# Patient Record
Sex: FEMALE | Race: WHITE | NOT HISPANIC OR LATINO | Employment: FULL TIME | URBAN - METROPOLITAN AREA
[De-identification: names, ages, dates, MRNs, and addresses within clinical notes are randomized per-mention and may not be internally consistent; named-entity substitution may affect disease eponyms.]

---

## 2021-02-17 ENCOUNTER — OFFICE VISIT (OUTPATIENT)
Dept: OBSTETRICS AND GYNECOLOGY | Facility: CLINIC | Age: 26
End: 2021-02-17

## 2021-02-17 VITALS
WEIGHT: 186.9 LBS | DIASTOLIC BLOOD PRESSURE: 78 MMHG | BODY MASS INDEX: 28.33 KG/M2 | HEIGHT: 68 IN | SYSTOLIC BLOOD PRESSURE: 128 MMHG

## 2021-02-17 DIAGNOSIS — N92.6 IRREGULAR MENSES: ICD-10-CM

## 2021-02-17 DIAGNOSIS — N96 HISTORY OF MULTIPLE MISCARRIAGES: ICD-10-CM

## 2021-02-17 DIAGNOSIS — R63.5 WEIGHT GAIN, ABNORMAL: ICD-10-CM

## 2021-02-17 DIAGNOSIS — Z01.419 ENCOUNTER FOR GYNECOLOGICAL EXAMINATION: Primary | ICD-10-CM

## 2021-02-17 PROCEDURE — 99385 PREV VISIT NEW AGE 18-39: CPT | Performed by: OBSTETRICS & GYNECOLOGY

## 2021-02-17 RX ORDER — VENLAFAXINE 75 MG/1
75 TABLET ORAL DAILY
COMMUNITY
End: 2022-01-20

## 2021-02-17 RX ORDER — ARIPIPRAZOLE 5 MG/1
TABLET ORAL
COMMUNITY
Start: 2021-01-18 | End: 2021-05-07

## 2021-02-17 RX ORDER — PRENATAL VIT,CAL 76/IRON/FOLIC 29 MG-1 MG
1 TABLET ORAL DAILY
Qty: 30 TABLET | Refills: 11 | Status: SHIPPED | OUTPATIENT
Start: 2021-02-17 | End: 2021-03-19

## 2021-02-17 RX ORDER — ALPRAZOLAM 0.5 MG/1
0.5 TABLET ORAL DAILY PRN
COMMUNITY
Start: 2021-01-18 | End: 2022-12-05

## 2021-02-17 RX ORDER — ZOLPIDEM TARTRATE 10 MG/1
10 TABLET ORAL NIGHTLY PRN
COMMUNITY
End: 2022-01-20

## 2021-02-17 NOTE — PROGRESS NOTES
GYN Annual Exam     CC - Here for annual exam.        HPI  Kat Funez is a 25 y.o. female, , who presents for annual well woman exam. Patient's last menstrual period was 2021..  Periods are irregular occurring every 4-6 weeks, lasting 3 days. .  Dysmenorrhea:severe, occurring second day.  Patient reports problems with: spotting for a day or 2 and only having 1 day of normal flow. Pt also reports concerns of weight gain and unable to conceive. States she has been trying for a year now. Partner Status: Marital Status: .  Desires STD Screening: no.    Additional OB/GYN History   Current contraception: contraceptive methods: None  Desires to: do not start contraception  Last Pap : 2019 normal per pt  Last Completed Pap Smear     Patient has no health maintenance due at this time        History of abnormal Pap smear: no  Family history of uterine, colon, breast, or ovarian cancer: no  Performs monthly Self-Breast Exam: yes  Exercises Regularly:yes  Feelings of Anxiety or Depression: yes - controlled with medication  Tobacco Usage?: No   OB History        2    Para        Term                AB   2    Living           SAB   2    TAB        Ectopic        Molar        Multiple        Live Births                    Health Maintenance   Topic Date Due   • ANNUAL PHYSICAL  1998   • HPV VACCINES (1 - 2-dose series) 2006   • TDAP/TD VACCINES (1 - Tdap) 2014   • INFLUENZA VACCINE  2020   • HEPATITIS C SCREENING  2021   • LIPID PANEL  2021   • Annual Gynecologic Pelvic and Breast Exam  2022   • Pneumococcal Vaccine 0-64  Aged Out   • MENINGOCOCCAL VACCINE  Aged Out       The additional following portions of the patient's history were reviewed and updated as appropriate: allergies, current medications, past family history, past medical history, past social history, past surgical history and problem list.    Review of Systems   Constitutional: Positive  "for unexpected weight gain. Negative for chills and fever.   Respiratory: Negative for cough and shortness of breath.    Cardiovascular: Negative for chest pain.   Gastrointestinal: Negative for abdominal distention and abdominal pain.   Genitourinary: Positive for menstrual problem. Negative for breast discharge, breast lump, breast pain, dysuria, frequency, pelvic pain, pelvic pressure and vaginal discharge.   Psychiatric/Behavioral: Negative for decreased concentration, suicidal ideas and depressed mood.   All other systems reviewed and are negative.    All other systems reviewed and are negative.     I have reviewed and agree with the HPI, ROS, and historical information as entered above. Mike Little MD    Objective   /78 (BP Location: Right arm, Patient Position: Sitting, Cuff Size: Adult)   Ht 172.7 cm (68\")   Wt 84.8 kg (186 lb 14.4 oz)   LMP 02/03/2021   Breastfeeding No   BMI 28.42 kg/m²     Physical Exam  Vitals signs and nursing note reviewed. Exam conducted with a chaperone present.   Constitutional:       Appearance: She is well-developed.   HENT:      Head: Normocephalic and atraumatic.   Neck:      Musculoskeletal: Normal range of motion. No muscular tenderness.      Thyroid: No thyroid mass or thyromegaly.   Cardiovascular:      Rate and Rhythm: Normal rate and regular rhythm.      Heart sounds: Normal heart sounds. No murmur.   Pulmonary:      Effort: Pulmonary effort is normal. No retractions.      Breath sounds: Normal breath sounds. No wheezing, rhonchi or rales.   Chest:      Chest wall: No mass or tenderness.      Breasts:         Right: Normal. No mass, nipple discharge, skin change or tenderness.         Left: Normal. No mass, nipple discharge, skin change or tenderness.   Abdominal:      General: Bowel sounds are normal.      Palpations: Abdomen is soft. Abdomen is not rigid. There is no mass.      Tenderness: There is no abdominal tenderness. There is no guarding.      " Hernia: No hernia is present. There is no hernia in the left inguinal area.   Genitourinary:     Labia:         Right: No rash, tenderness or lesion.         Left: No rash, tenderness or lesion.       Vagina: Normal. No vaginal discharge or lesions.      Cervix: No cervical motion tenderness, discharge, lesion or cervical bleeding.      Uterus: Normal. Not enlarged, not fixed and not tender.       Adnexa:         Right: No mass or tenderness.          Left: No mass or tenderness.        Rectum: No external hemorrhoid.      Comments: Without lesions or discharge.  Uterus normal size nontender.  Neurological:      Mental Status: She is alert and oriented to person, place, and time.   Psychiatric:         Behavior: Behavior normal.            Assessment and Plan    Problem List Items Addressed This Visit     History of multiple miscarriages    Overview     MAB x 2; 2017 and 2020.          Irregular menses    Relevant Orders    CBC (No Diff)    TSH    MTHFR Mutation    ABO / Rh    Luteinizing Hormone    Follicle Stimulating Hormone    Glucose, Random    Insulin, Random    Weight gain, abnormal    Overview     11/2020; 20 lbs in  3 months.          Relevant Orders    CBC (No Diff)    TSH    MTHFR Mutation    ABO / Rh    Luteinizing Hormone    Follicle Stimulating Hormone    Glucose, Random    Insulin, Random      Other Visit Diagnoses     Encounter for gynecological examination    -  Primary    Relevant Orders    Pap IG, Ct-Ng TV Rfx HPV All          1. GYN annual well woman exam.  She is 25 years of age.  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 History of 2 prior spontaneous miscarriages.  2017 and 12/20/2020.  Begin laboratory evaluation.    3. Irregular menses.  Previously her periods were regular and heavy up to about a year ago.  Now every 4 to 6 weeks and short.  Denies any medication changes.  She has gained about 20 pounds mostly since November 2020.  4. History of depression.  Improved.  Been  on Abilify for only 1 month and is planning to wean off of the Effexor.  5. Preconceptual counseling.  Start prenatal vitamins.  Call as soon as conceives in order to check progesterone level and follow hCGs.  6. Abnormal weight gain.  20 pounds since November 2020.  Check TSH and fasting insulin.  Discussed low-carb diet and exercise.  7. Preconceptual Counseling.  Discussed timed intercourse, regular cycles, prenatal vitamins, and when to call.  8. Reviewed monthly self breast exams.  Instructed to call with lumps, pain, or breast discharge.    9. Reviewed BMI and weight loss as preventative health measures.   10. RTC in 1 year or PRN with problems  11. Other: Return in 3 months for follow-up of menses if has not conceived.      Mike Little MD  02/17/2021

## 2021-02-24 LAB
ABO GROUP BLD: NORMAL
ERYTHROCYTE [DISTWIDTH] IN BLOOD BY AUTOMATED COUNT: 12.8 % (ref 12.3–15.4)
FSH SERPL-ACNC: 3.8 MIU/ML
GLUCOSE SERPL-MCNC: 86 MG/DL (ref 65–99)
HCT VFR BLD AUTO: 40.4 % (ref 34–46.6)
HGB BLD-MCNC: 13 G/DL (ref 12–15.9)
INSULIN SERPL-ACNC: 33 UIU/ML
LH SERPL-ACNC: 15.9 MIU/ML
MCH RBC QN AUTO: 28.5 PG (ref 26.6–33)
MCHC RBC AUTO-ENTMCNC: 32.2 G/DL (ref 31.5–35.7)
MCV RBC AUTO: 88.6 FL (ref 79–97)
MTHFR GENE MUT ANL BLD/T: NORMAL
PLATELET # BLD AUTO: 277 10*3/MM3 (ref 140–450)
RBC # BLD AUTO: 4.56 10*6/MM3 (ref 3.77–5.28)
RH BLD: POSITIVE
TSH SERPL DL<=0.005 MIU/L-ACNC: 2.34 UIU/ML (ref 0.27–4.2)
WBC # BLD AUTO: 7.08 10*3/MM3 (ref 3.4–10.8)

## 2021-03-02 ENCOUNTER — TELEPHONE (OUTPATIENT)
Dept: OBSTETRICS AND GYNECOLOGY | Facility: CLINIC | Age: 26
End: 2021-03-02

## 2021-03-02 RX ORDER — METFORMIN HYDROCHLORIDE 500 MG/1
1500 TABLET, EXTENDED RELEASE ORAL NIGHTLY
Qty: 90 TABLET | Refills: 5 | Status: SHIPPED | OUTPATIENT
Start: 2021-03-02 | End: 2022-01-20 | Stop reason: SDUPTHER

## 2021-03-02 NOTE — TELEPHONE ENCOUNTER
Lab work consistent with PCOS and insulin resistance.  Start Metformin  mg nightly and increase weekly to 1500 mg as tolerated.    [facility].

## 2021-03-04 ENCOUNTER — TELEPHONE (OUTPATIENT)
Dept: OBSTETRICS AND GYNECOLOGY | Facility: CLINIC | Age: 26
End: 2021-03-04

## 2021-03-04 RX ORDER — FLUCONAZOLE 150 MG/1
150 TABLET ORAL ONCE
Qty: 1 TABLET | Refills: 0 | Status: SHIPPED | OUTPATIENT
Start: 2021-03-04 | End: 2021-03-04

## 2021-03-08 ENCOUNTER — TELEPHONE (OUTPATIENT)
Dept: OBSTETRICS AND GYNECOLOGY | Facility: CLINIC | Age: 26
End: 2021-03-08

## 2021-03-10 DIAGNOSIS — Z31.9 INFERTILITY MANAGEMENT: ICD-10-CM

## 2021-03-10 DIAGNOSIS — N97.0 INFERTILITY, ANOVULATION: Primary | ICD-10-CM

## 2021-03-10 NOTE — TELEPHONE ENCOUNTER
Spoke with MD about patient and her history. Per MD, 50mg clomid Days 3-7. LH tests 5 days after finishing clomid. At fist light positive, proceed with IC (stressed the need to wait until first light positive). Verbalized understanding. Follow up appointment scheduled with MD on 3/31 at 2:45pm.

## 2021-05-05 ENCOUNTER — TELEPHONE (OUTPATIENT)
Dept: OBSTETRICS AND GYNECOLOGY | Facility: CLINIC | Age: 26
End: 2021-05-05

## 2021-05-06 PROBLEM — E88.81 INSULIN RESISTANCE: Status: ACTIVE | Noted: 2021-05-06

## 2021-05-06 NOTE — TELEPHONE ENCOUNTER
Following up with Dr. Little after mistakenly forwarding to Dr. Wilcox. Per Steven SOLOMON APRN spoke with the patient today.

## 2021-05-07 ENCOUNTER — OFFICE VISIT (OUTPATIENT)
Dept: OBSTETRICS AND GYNECOLOGY | Facility: CLINIC | Age: 26
End: 2021-05-07

## 2021-05-07 VITALS
DIASTOLIC BLOOD PRESSURE: 72 MMHG | SYSTOLIC BLOOD PRESSURE: 112 MMHG | HEIGHT: 68 IN | BODY MASS INDEX: 26.92 KG/M2 | WEIGHT: 177.6 LBS

## 2021-05-07 DIAGNOSIS — Z31.69 PRE-CONCEPTION COUNSELING: ICD-10-CM

## 2021-05-07 DIAGNOSIS — R63.5 WEIGHT GAIN, ABNORMAL: ICD-10-CM

## 2021-05-07 DIAGNOSIS — N92.6 IRREGULAR MENSES: ICD-10-CM

## 2021-05-07 DIAGNOSIS — E88.81 INSULIN RESISTANCE: Primary | ICD-10-CM

## 2021-05-07 PROCEDURE — 99213 OFFICE O/P EST LOW 20 MIN: CPT | Performed by: OBSTETRICS & GYNECOLOGY

## 2021-05-07 RX ORDER — LAMOTRIGINE 100 MG/1
TABLET ORAL
COMMUNITY
Start: 2021-05-05 | End: 2022-08-15

## 2021-05-07 RX ORDER — PRENATAL VIT,CAL 76/IRON/FOLIC 29 MG-1 MG
1 TABLET ORAL DAILY
Qty: 30 TABLET | Refills: 11 | Status: SHIPPED | OUTPATIENT
Start: 2021-05-07 | End: 2021-06-06

## 2021-05-07 RX ORDER — BUSPIRONE HYDROCHLORIDE 10 MG/1
TABLET ORAL
COMMUNITY
Start: 2021-05-05 | End: 2022-01-20

## 2021-12-15 ENCOUNTER — TELEPHONE (OUTPATIENT)
Dept: OBSTETRICS AND GYNECOLOGY | Facility: CLINIC | Age: 26
End: 2021-12-15

## 2021-12-15 DIAGNOSIS — N92.1 METRORRHAGIA: Primary | ICD-10-CM

## 2021-12-15 NOTE — TELEPHONE ENCOUNTER
Patient reports bleeding became heavy with dysmenorrhea most recent cycle.    December 1st, she spotted for 4 days. Then, her menstrual started on December 10th, lasting 2.5 days. She reports changed pad/tampon hourly at heaviest flow; clots present and with dysmenorrhea.    Reports that she started bleeding again today; flow currently light without clots. Mild dysmenorrhea.    Patient reports that this is the first time she's had bleeding like this. Tracking menstruals/ovulation as she is trying for pregnancy. 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 Multiple rounds of clomid.    Appointment tomorrow at [facility] (per patient preference); 3:30pm U/S and [place] after.

## 2021-12-16 ENCOUNTER — OFFICE VISIT (OUTPATIENT)
Dept: OBSTETRICS AND GYNECOLOGY | Facility: CLINIC | Age: 26
End: 2021-12-16

## 2021-12-16 VITALS — BODY MASS INDEX: 26.61 KG/M2 | SYSTOLIC BLOOD PRESSURE: 130 MMHG | DIASTOLIC BLOOD PRESSURE: 80 MMHG | WEIGHT: 175 LBS

## 2021-12-16 DIAGNOSIS — R63.5 WEIGHT GAIN, ABNORMAL: ICD-10-CM

## 2021-12-16 DIAGNOSIS — N97.0 INFERTILITY, ANOVULATION: ICD-10-CM

## 2021-12-16 DIAGNOSIS — N94.9 ADNEXAL CYST: ICD-10-CM

## 2021-12-16 DIAGNOSIS — E28.2 PCOS (POLYCYSTIC OVARIAN SYNDROME): ICD-10-CM

## 2021-12-16 DIAGNOSIS — E88.81 INSULIN RESISTANCE: Primary | ICD-10-CM

## 2021-12-16 DIAGNOSIS — Z31.69 PRE-CONCEPTION COUNSELING: ICD-10-CM

## 2021-12-16 DIAGNOSIS — N96 HISTORY OF MULTIPLE MISCARRIAGES: ICD-10-CM

## 2021-12-16 DIAGNOSIS — N93.9 ABNORMAL UTERINE BLEEDING (AUB): ICD-10-CM

## 2021-12-16 DIAGNOSIS — N92.6 IRREGULAR MENSES: ICD-10-CM

## 2021-12-16 PROCEDURE — 99214 OFFICE O/P EST MOD 30 MIN: CPT | Performed by: OBSTETRICS & GYNECOLOGY

## 2021-12-16 RX ORDER — QUETIAPINE FUMARATE 100 MG/1
50 TABLET, FILM COATED ORAL 2 TIMES DAILY
COMMUNITY
Start: 2021-11-09 | End: 2022-11-10

## 2021-12-16 RX ORDER — MEDROXYPROGESTERONE ACETATE 10 MG/1
10 TABLET ORAL DAILY
Qty: 10 TABLET | Refills: 0 | Status: SHIPPED | OUTPATIENT
Start: 2021-12-16 | End: 2021-12-26

## 2021-12-16 RX ORDER — PROPRANOLOL HYDROCHLORIDE 40 MG/1
TABLET ORAL
COMMUNITY
End: 2022-09-27

## 2021-12-16 RX ORDER — NEONATAL PLUS VITAMIN 1200; 20; 10; 9.2; 1.84; 3; 20; 10; 1000; 12; 27; 5; 2; 200; .2; 25; 2 UG/1; MG/1; UG/1; MG/1; MG/1; MG/1; MG/1; MG/1; UG/1; UG/1; MG/1; MG/1; MG/1; MG/1; MG/1; MG/1; MG/1
1 TABLET ORAL DAILY
Qty: 30 TABLET | Refills: 11 | Status: SHIPPED | OUTPATIENT
Start: 2021-12-16 | End: 2022-09-15

## 2021-12-16 RX ORDER — DESVENLAFAXINE SUCCINATE 50 MG/1
TABLET, EXTENDED RELEASE ORAL
COMMUNITY
Start: 2021-08-25 | End: 2022-08-15

## 2021-12-16 NOTE — PROGRESS NOTES
Gynecologic Exam Note      Chief Complaint   Patient presents with   • gyn followup       Subjective   HPI  Kat Corado is a 25 y.o. female, , who presents for irregular periods.      She states she has experienced this problem for 6 months.  She describes the severity as moderate.  She states that the problem is annoying.  The patient reports additional symptoms as patient bleed spotted 4 days before period then bleed super heavy .      Her last LMP was Patient's last menstrual period was 12/10/2021..  Periods are irregular, lasting 3 days.  Dysmenorrhea:moderate, occurring throughout cycle.  Patient reports problems with: none.  Partner Status: Marital Status: .  New Partners since last visit: no.  Desires STD Screening: no.    Additional OB/GYN History   Current contraception: contraceptive methods: None  Desires to: do not start contraception  Last Pap : neg   Last Completed Pap Smear          PAP SMEAR (Every 3 Years) Next due on 2021  SCANNED - PAP SMEAR              History of abnormal Pap smear: no  Last mammogram:   Last Completed Mammogram     This patient has no relevant Health Maintenance data.        Tobacco Usage?: No   OB History        2    Para        Term                AB   2    Living           SAB   2    IAB        Ectopic        Molar        Multiple        Live Births                    Health Maintenance   Topic Date Due   • ANNUAL PHYSICAL  Never done   • COVID-19 Vaccine (1) Never done   • HPV VACCINES (1 - 2-dose series) Never done   • TDAP/TD VACCINES (1 - Tdap) Never done   • HEPATITIS C SCREENING  Never done   • LIPID PANEL  Never done   • INFLUENZA VACCINE  Never done   • Annual Gynecologic Pelvic and Breast Exam  2022   • PAP SMEAR  2024   • Pneumococcal Vaccine 0-64  Aged Out       The additional following portions of the patient's history were reviewed and updated as appropriate: allergies, current medications,  past family history, past medical history, past social history, past surgical history and problem list.    Review of Systems   Constitutional: Negative for chills and fever.   Respiratory: Negative.    Cardiovascular: Negative.    Genitourinary: Negative.        I have reviewed and agree with the HPI, ROS, and historical information as entered above. Mike Little MD    Objective   /80   Wt 79.4 kg (175 lb)   LMP 12/10/2021   BMI 26.61 kg/m²     Physical Exam  Vitals and nursing note reviewed.   Constitutional:       Appearance: Normal appearance.   HENT:      Head: Normocephalic.   Pulmonary:      Effort: Pulmonary effort is normal.   Abdominal:      General: Abdomen is flat. There is no distension.      Palpations: Abdomen is soft. There is no mass.      Tenderness: There is no abdominal tenderness.   Neurological:      Mental Status: She is alert and oriented to person, place, and time.   Psychiatric:         Behavior: Behavior normal.         Assessment/Plan     Assessment     Problem List Items Addressed This Visit     History of multiple miscarriages    Overview     MAB x 2; 2017 and 2020.   MTHFR was negative.         Irregular menses    Overview     Was having monthly bleeding episodes and then 12/10/2021 had heavy prolonged.  Followed by spotting.    Rx Provera for 10 days to see if straightens up bleeding.         Weight gain, abnormal    Overview     11/2020; 20 lbs in  3 months.          Insulin resistance - Primary    Overview     Fasting glucose was normal. Fasting insulin was 33.  3/2021 begun Metformin ER and increase to 1500 mg nightly         Pre-conception counseling    Overview     Took clomiphene 50 mg cycle days 3 through 7 in February 2021 with positive ovulation test.    Anovulation in March and April.  But had 30-day cycles.         Current Assessment & Plan     Patient states that she has been checking LH test and is not ovulating on Metformin 1500 mg nightly.    Rx for  prenatal vitamins sent.         Adnexal cyst    Overview     Left adnexal cyst measuring 3.4 x 2.8 x 2.1 cm with smooth borders.  Appears to be adjacent to normal-appearing left ovary.         PCOS (polycystic ovarian syndrome)    Overview     Multiple small ovarian follicles.  Insulin resistance.           Infertility, anovulation    Overview     Restart Clomiphene 50 mg days 3-7.    Provera 10 mg for 10 days.  They can start clomiphene on cycle day 3.         Abnormal uterine bleeding (AUB)            Plan     Return in about 5 weeks (around 1/20/2022).  1. Rx prenatal vitamins sent to pharmacy.  2. Rx Provera 10 mg for 10 days; then can start clomiphene 50 mg days 3 through 7.  3. Call if develops worsening left lower quadrant pain.  Repeat ultrasound for left adnexal cyst in 2 to 3 months.      Mike Little MD  12/16/2021

## 2021-12-16 NOTE — ASSESSMENT & PLAN NOTE
Patient states that she has been checking LH test and is not ovulating on Metformin 1500 mg nightly.    Rx for prenatal vitamins sent.

## 2022-01-20 ENCOUNTER — OFFICE VISIT (OUTPATIENT)
Dept: OBSTETRICS AND GYNECOLOGY | Facility: CLINIC | Age: 27
End: 2022-01-20

## 2022-01-20 VITALS
SYSTOLIC BLOOD PRESSURE: 144 MMHG | HEIGHT: 68 IN | WEIGHT: 180.4 LBS | BODY MASS INDEX: 27.34 KG/M2 | DIASTOLIC BLOOD PRESSURE: 105 MMHG

## 2022-01-20 DIAGNOSIS — E28.2 PCOS (POLYCYSTIC OVARIAN SYNDROME): ICD-10-CM

## 2022-01-20 DIAGNOSIS — N97.0 INFERTILITY, ANOVULATION: Primary | ICD-10-CM

## 2022-01-20 DIAGNOSIS — Z12.4 SCREENING FOR CERVICAL CANCER: ICD-10-CM

## 2022-01-20 DIAGNOSIS — E88.81 INSULIN RESISTANCE: ICD-10-CM

## 2022-01-20 DIAGNOSIS — R63.5 WEIGHT GAIN, ABNORMAL: ICD-10-CM

## 2022-01-20 DIAGNOSIS — Z31.69 PRE-CONCEPTION COUNSELING: ICD-10-CM

## 2022-01-20 DIAGNOSIS — N96 HISTORY OF MULTIPLE MISCARRIAGES: ICD-10-CM

## 2022-01-20 DIAGNOSIS — N94.9 ADNEXAL CYST: ICD-10-CM

## 2022-01-20 DIAGNOSIS — N92.6 IRREGULAR MENSES: ICD-10-CM

## 2022-01-20 PROBLEM — R10.32 LEFT LOWER QUADRANT ABDOMINAL PAIN: Status: ACTIVE | Noted: 2022-01-20

## 2022-01-20 PROCEDURE — 99214 OFFICE O/P EST MOD 30 MIN: CPT | Performed by: OBSTETRICS & GYNECOLOGY

## 2022-01-20 RX ORDER — DEXTROAMPHETAMINE SACCHARATE, AMPHETAMINE ASPARTATE, DEXTROAMPHETAMINE SULFATE AND AMPHETAMINE SULFATE 1.875; 1.875; 1.875; 1.875 MG/1; MG/1; MG/1; MG/1
7.5 TABLET ORAL DAILY
COMMUNITY
Start: 2022-01-19 | End: 2022-12-05

## 2022-01-20 RX ORDER — LISDEXAMFETAMINE DIMESYLATE 40 MG/1
CAPSULE ORAL
COMMUNITY
Start: 2022-01-19 | End: 2022-08-15

## 2022-01-20 RX ORDER — METFORMIN HYDROCHLORIDE 500 MG/1
1500 TABLET, EXTENDED RELEASE ORAL NIGHTLY
Qty: 90 TABLET | Refills: 5 | Status: SHIPPED | OUTPATIENT
Start: 2022-01-20 | End: 2022-09-15 | Stop reason: SDUPTHER

## 2022-01-20 RX ORDER — QUETIAPINE FUMARATE 50 MG/1
TABLET, FILM COATED ORAL
COMMUNITY
Start: 2022-01-14 | End: 2022-01-20

## 2022-01-20 NOTE — PROGRESS NOTES
Gynecologic Exam Note      Chief Complaint   Patient presents with   • Follow-up       Subjective   HPI  Kat Corado is a 26 y.o. female, , who presents for follow up from Provera and Clomiphene.        Her last LMP was Patient's last menstrual period was 2022 (exact date). Periods are regular every 28-30 days, lasting 3 days.  Dysmenorrhea:mild to severe.  Patient reports problems with: left lower quadrant pain.  Patient has been having spotting for the last 5 weeks and has been trying to conceive. Patient started taking provera and started her most recent period on 2022. Patient reported that this period was light spotting and then she passed a large clot on 2022. She started taking Clomiphene on 2022. Period ended 2022 and it was just minimal brown spotting. Partner Status: Marital Status: .  New Partners since last visit: no.  Desires STD Screening: no.    Patient did not receive metformin at pharmacy.  Has been out of   metformin for 6 months.  Patient took Provera for 10 days to induce menses.  Her menses was lighter than usual and lasted 3 days.  She has started clomiphene 50 mg cycle days 3 through 7 on  through 2022.  She has not ovulated yet.  She has previously taken that clomiphene in 2021.    Pt had episode of LLQ pain about 3 weeks ago that lasted one day and then improved. Pain was severe and described as soreness or tenderness. Occurred randomly; no relation to GI symptoms. Denies h/o diarrhea or constipation. No h/o back pain or sciatica. Worse with any activity or standing/ walking. Better with  Rest.     Patient having anxiety regarding family member with peritoneal cancer; would like to discuss last ultrasound in further detail.     Additional OB/GYN History   Current contraception: contraceptive methods: None  Desires to: do not start contraception  Last Pap :   Last Completed Pap Smear          PAP SMEAR (Every 3 Years)  "Next due on 2021  SCANNED - PAP SMEAR              History of abnormal Pap smear: no  Last mammogram: Never  Last Completed Mammogram     This patient has no relevant Health Maintenance data.        Tobacco Usage?: No   OB History        2    Para        Term                AB   2    Living           SAB   2    IAB        Ectopic        Molar        Multiple        Live Births                    Health Maintenance   Topic Date Due   • ANNUAL PHYSICAL  Never done   • COVID-19 Vaccine (1) Never done   • HPV VACCINES (1 - 2-dose series) Never done   • TDAP/TD VACCINES (1 - Tdap) Never done   • HEPATITIS C SCREENING  Never done   • LIPID PANEL  Never done   • INFLUENZA VACCINE  2021   • Annual Gynecologic Pelvic and Breast Exam  2022   • PAP SMEAR  2024   • Pneumococcal Vaccine 0-64  Aged Out       The additional following portions of the patient's history were reviewed and updated as appropriate: allergies and current medications.    Review of Systems   Respiratory: Negative.    Cardiovascular: Negative.    Gastrointestinal: Negative for abdominal distention, abdominal pain, constipation and diarrhea.   Genitourinary: Positive for pelvic pain. Negative for difficulty urinating, dysuria, pelvic pressure, vaginal bleeding and vaginal discharge.   Psychiatric/Behavioral: Negative for dysphoric mood and depressed mood.       I have reviewed and agree with the HPI, ROS, and historical information as entered above. Mike Little MD    Objective   BP (!) 144/105 (BP Location: Left arm, Patient Position: Sitting, Cuff Size: Adult)   Ht 172.7 cm (68\")   Wt 81.8 kg (180 lb 6.4 oz)   LMP 2022 (Exact Date)   BMI 27.43 kg/m²     Physical Exam  Vitals and nursing note reviewed. Exam conducted with a chaperone present.   Constitutional:       Appearance: Normal appearance. She is normal weight.   HENT:      Head: Normocephalic and atraumatic.   Pulmonary:      Effort: " Pulmonary effort is normal.   Abdominal:      General: Abdomen is flat. There is no distension.      Palpations: Abdomen is soft. There is no mass.      Tenderness: There is no abdominal tenderness. There is no guarding or rebound.   Genitourinary:     General: Normal vulva.      Exam position: Lithotomy position.      Labia:         Right: No rash, tenderness, lesion or injury.         Left: No rash, tenderness, lesion or injury.       Cervix: No cervical motion tenderness.      Uterus: Normal. Not enlarged, not fixed and not tender.       Adnexa:         Right: No mass, tenderness or fullness.          Left: Tenderness and fullness present.       Comments: Cervix mid-line; No CMT. Uterus midline; small non tender. Mild fullness in left adnexa lateral pelvis; mild tenderness.   Neurological:      Mental Status: She is alert and oriented to person, place, and time.   Psychiatric:         Behavior: Behavior normal.         Assessment/Plan     Assessment     Problem List Items Addressed This Visit     History of multiple miscarriages    Overview     MAB x 2; 2017 and 2020.   MTHFR was negative.         Irregular menses    Overview     Was having monthly bleeding episodes and then 12/10/2021 had heavy prolonged.  Followed by spotting.    Rx Provera for 10 days to see if straightens up bleeding.         Weight gain, abnormal    Overview     11/2020; 20 lbs in  3 months.          Insulin resistance    Overview     Fasting glucose was normal. Fasting insulin was 33.  3/2021 begun Metformin ER and increase to 1500 mg nightly         Current Assessment & Plan     1/20/22 Pt did not get Metformin yet;   Refilled Metformin ER 1500 mg qhs.         Pre-conception counseling    Overview     Took clomiphene 50 mg cycle days 3 through 7 in February 2021 with positive ovulation test.    Anovulation in March and April.  But had 30-day cycles.         Adnexal cyst    Overview     12/16/2021 ultrasound; left adnexal cyst measuring 3.4  x 2.8 x 2.1 cm with smooth borders.  Appears to be adjacent to normal-appearing left ovary.    Some mild tenderness and fullness on exam lateral left lower pelvic area.  Ultrasound 1/20/2022 with 3.2 x 2.0 x 2.3 cm left adnexal cyst.  Separate from the ovary.    Repeat u/s in 2-3 months.          Current Assessment & Plan     Discussed options for diagnostic laparoscopy and excision of adnexal cyst.  Patient may wish to proceed if pain persists or worsens.         Relevant Orders    US Non-ob Transvaginal    US Non-ob Transvaginal    PCOS (polycystic ovarian syndrome)    Overview     Multiple small ovarian follicles.  Insulin resistance.           Infertility, anovulation - Primary    Overview     12/16/21; Restart Clomiphene 50 mg days 3-7.     Rx Provera 10 mg for 10 days.  They can start clomiphene on cycle day 3.    Had light menses post provera. Restarted Clomiphene 50 mg from 1/13/21 to 1/18/21. Will need to increase to 100 mg if does not ovulate.          Screening for cervical cancer    Overview     Pap smear 2/17/2021 was negative. GC/ Chlamydia negative.                  Plan   1.   Ultrasound performed today and personally reviewed; cyst may be slightly smaller. Left adnexal cyst is separate from ovary and may be paratubal cyst.   Return in about 2 weeks (around 2/3/2022) for Next scheduled follow up.  1. Due for annual exam and f/u u/s in March 2022.   2. Call if fails to ovulate to increase clomiphene to 100 mg day 3-7 next cycle.  3. BP elevation today; not on medication. Will f/u with PCP.   4. Restart Metformin ER 1500 mg nightly.       Mike Little MD  01/20/2022

## 2022-01-20 NOTE — ASSESSMENT & PLAN NOTE
Discussed options for diagnostic laparoscopy and excision of adnexal cyst.  Patient may wish to proceed if pain persists or worsens.

## 2022-02-03 DIAGNOSIS — Z31.69 PRE-CONCEPTION COUNSELING: Primary | ICD-10-CM

## 2022-03-03 ENCOUNTER — OFFICE VISIT (OUTPATIENT)
Dept: OBSTETRICS AND GYNECOLOGY | Facility: CLINIC | Age: 27
End: 2022-03-03

## 2022-03-03 VITALS
SYSTOLIC BLOOD PRESSURE: 118 MMHG | WEIGHT: 175 LBS | HEIGHT: 68 IN | DIASTOLIC BLOOD PRESSURE: 80 MMHG | BODY MASS INDEX: 26.52 KG/M2

## 2022-03-03 DIAGNOSIS — Z31.69 PRE-CONCEPTION COUNSELING: ICD-10-CM

## 2022-03-03 DIAGNOSIS — Z01.419 PAP TEST, AS PART OF ROUTINE GYNECOLOGICAL EXAMINATION: Primary | ICD-10-CM

## 2022-03-03 DIAGNOSIS — N94.9 ADNEXAL CYST: ICD-10-CM

## 2022-03-03 DIAGNOSIS — E88.81 INSULIN RESISTANCE: ICD-10-CM

## 2022-03-03 DIAGNOSIS — E28.2 PCOS (POLYCYSTIC OVARIAN SYNDROME): ICD-10-CM

## 2022-03-03 PROCEDURE — 99214 OFFICE O/P EST MOD 30 MIN: CPT | Performed by: OBSTETRICS & GYNECOLOGY

## 2022-03-03 PROCEDURE — 99395 PREV VISIT EST AGE 18-39: CPT | Performed by: OBSTETRICS & GYNECOLOGY

## 2022-03-03 RX ORDER — LETROZOLE 2.5 MG/1
5 TABLET, FILM COATED ORAL DAILY
Qty: 10 TABLET | Refills: 0 | Status: SHIPPED | OUTPATIENT
Start: 2022-03-03 | End: 2022-03-08

## 2022-03-03 NOTE — PROGRESS NOTES
GYN Annual Exam     CC - Here for annual exam.        HPI  Kat Corado is a 26 y.o. female, , who presents for annual well woman exam. 2022.  Periods are regular every 25-35 days, lasting 3 days. .  Dysmenorrhea:severe, occurring first 1-2 days of flow.  Patient reports problems with: none.  There were no changes to her medical or surgical history since her last visit.. Partner Status: Marital Status: .  She is sexually active. She has not had new partners since her last STD testing. She does not desire STD testing. . She has not had her HPV vaccines.     Pt states her LMP was 2/10/22; she took Clomiphene 100 mg days 3-7 and did not ovulate.     Additional OB/GYN History   Current contraception: contraceptive methods: None  Desires to: do not start contraception  Last Pap : 2021 . Results: neg  Last Completed Pap Smear          PAP SMEAR (Every 3 Years) Next due on 2021  SCANNED - PAP SMEAR              History of abnormal Pap smear: no  Family history of uterine, colon, breast, or ovarian cancer: yes - ma breast  Performs monthly Self-Breast Exam: yes  Exercises Regularly:yes  Feelings of Anxiety or Depression: yes - medication  Tobacco Usage?: No   OB History        2    Para        Term                AB   2    Living           SAB   2    IAB        Ectopic        Molar        Multiple        Live Births                    Health Maintenance   Topic Date Due   • ANNUAL PHYSICAL  Never done   • COVID-19 Vaccine (1) Never done   • HPV VACCINES (1 - 2-dose series) Never done   • TDAP/TD VACCINES (1 - Tdap) Never done   • HEPATITIS C SCREENING  Never done   • LIPID PANEL  Never done   • INFLUENZA VACCINE  2021   • Annual Gynecologic Pelvic and Breast Exam  2022   • PAP SMEAR  2024   • Pneumococcal Vaccine 0-64  Aged Out       The additional following portions of the patient's history were reviewed and updated as appropriate:  "allergies, current medications, past family history, past medical history, past social history, past surgical history and problem list.    Review of Systems   Constitutional: Negative for chills and fever.   Respiratory: Negative.    Cardiovascular: Negative.    Gastrointestinal: Negative for abdominal distention and abdominal pain.   Genitourinary: Negative.    Psychiatric/Behavioral: Negative for decreased concentration and depressed mood.         I have reviewed and agree with the HPI, ROS, and historical information as entered above. Mike Little MD    Objective   /80   Ht 172.7 cm (68\")   Wt 79.4 kg (175 lb)   LMP 02/11/2022   BMI 26.61 kg/m²     Physical Exam  Vitals and nursing note reviewed. Exam conducted with a chaperone present.   Constitutional:       Appearance: She is well-developed.   HENT:      Head: Normocephalic and atraumatic.   Neck:      Thyroid: No thyroid mass or thyromegaly.   Cardiovascular:      Rate and Rhythm: Normal rate and regular rhythm.      Heart sounds: No murmur heard.      Pulmonary:      Effort: Pulmonary effort is normal. No retractions.      Breath sounds: Normal breath sounds. No wheezing, rhonchi or rales.   Chest:      Chest wall: No mass or tenderness.   Breasts:      Right: Normal. No mass, nipple discharge, skin change or tenderness.      Left: Normal. No mass, nipple discharge, skin change or tenderness.       Abdominal:      General: Bowel sounds are normal.      Palpations: Abdomen is soft. Abdomen is not rigid. There is no mass.      Tenderness: There is no abdominal tenderness. There is no guarding.      Hernia: No hernia is present. There is no hernia in the left inguinal area.   Genitourinary:     Labia:         Right: No rash, tenderness or lesion.         Left: No rash, tenderness or lesion.       Vagina: Normal. No vaginal discharge or lesions.      Cervix: No cervical motion tenderness, discharge, lesion or cervical bleeding.      Uterus: " Normal. Not enlarged, not fixed and not tender.       Adnexa:         Right: No mass or tenderness.          Left: No mass or tenderness.        Rectum: No external hemorrhoid.      Comments: Cervix without discharge or lesions; pap smear obtained.   Musculoskeletal:      Cervical back: Normal range of motion. No muscular tenderness.   Neurological:      Mental Status: She is alert and oriented to person, place, and time.   Psychiatric:         Behavior: Behavior normal.            Assessment and Plan    Problem List Items Addressed This Visit     Insulin resistance    Overview     Fasting glucose was normal. Fasting insulin was 33.  3/2021 begun Metformin ER and increase to 1500 mg nightly         Pre-conception counseling    Overview     Took clomiphene 50 mg cycle days 3 through 7 in February 2021 with positive ovulation test.    Anovulation in March and April.  But had 30-day cycles.         Adnexal cyst    Overview     12/16/2021 ultrasound; left adnexal cyst measuring 3.4 x 2.8 x 2.1 cm with smooth borders.  Appears to be adjacent to normal-appearing left ovary.    Some mild tenderness and fullness on exam lateral left lower pelvic area.  Ultrasound 1/20/2022 with 3.2 x 2.0 x 2.3 cm left adnexal cyst.  Separate from the ovary.    Patient states she had an ultrasound in 2017 at [facility] that showed a 2.5 x 3 cm left adnexal cyst next to the ovary.    Repeat u/s in 2-3 months.          PCOS (polycystic ovarian syndrome)    Overview     Multiple small ovarian follicles.  Insulin resistance.             Other Visit Diagnoses     Pap test, as part of routine gynecological examination    -  Primary    Relevant Orders    GP,CTNG,APTIMA HPV          1. GYN annual well woman exam. 27 yo; Pap smear done today.  2. PCOS with insulin resistance; continue with Metformin.   3. Infertility with anovulation; did not ovulate on Clomid 100 mg; wants to try Femara 5 mg days 3-7.   4. Left adnexal cyst; appear to be stable  since 2017.   5. Reviewed monthly self breast exams.  Instructed to call with lumps, pain, or breast discharge.    6. Reviewed exercise as a preventative health measures.   7. Reccommended Flu Vaccine in Fall of each year.  8. RTC in 1 year or PRN with problems  Return in about 4 weeks (around 3/31/2022) for Next scheduled follow up.      Mike Little MD  03/03/2022

## 2022-03-10 DIAGNOSIS — Z01.419 PAP TEST, AS PART OF ROUTINE GYNECOLOGICAL EXAMINATION: ICD-10-CM

## 2022-03-17 ENCOUNTER — OFFICE VISIT (OUTPATIENT)
Dept: OBSTETRICS AND GYNECOLOGY | Facility: CLINIC | Age: 27
End: 2022-03-17

## 2022-03-17 ENCOUNTER — TELEPHONE (OUTPATIENT)
Dept: OBSTETRICS AND GYNECOLOGY | Facility: CLINIC | Age: 27
End: 2022-03-17

## 2022-03-17 VITALS
WEIGHT: 178 LBS | SYSTOLIC BLOOD PRESSURE: 118 MMHG | HEIGHT: 68 IN | BODY MASS INDEX: 26.98 KG/M2 | DIASTOLIC BLOOD PRESSURE: 80 MMHG

## 2022-03-17 DIAGNOSIS — N94.6 DYSMENORRHEA: ICD-10-CM

## 2022-03-17 DIAGNOSIS — R10.32 LLQ PAIN: Primary | ICD-10-CM

## 2022-03-17 DIAGNOSIS — N94.9 ADNEXAL CYST: Primary | ICD-10-CM

## 2022-03-17 DIAGNOSIS — E28.2 PCOS (POLYCYSTIC OVARIAN SYNDROME): ICD-10-CM

## 2022-03-17 DIAGNOSIS — R10.32 LEFT LOWER QUADRANT ABDOMINAL PAIN: ICD-10-CM

## 2022-03-17 DIAGNOSIS — N97.0 INFERTILITY, ANOVULATION: ICD-10-CM

## 2022-03-17 PROCEDURE — 99214 OFFICE O/P EST MOD 30 MIN: CPT | Performed by: OBSTETRICS & GYNECOLOGY

## 2022-03-17 RX ORDER — HYDROCODONE BITARTRATE AND ACETAMINOPHEN 5; 325 MG/1; MG/1
1-2 TABLET ORAL EVERY 4 HOURS PRN
Qty: 24 TABLET | Refills: 0 | Status: SHIPPED | OUTPATIENT
Start: 2022-03-17 | End: 2022-03-22

## 2022-03-17 RX ORDER — NAPROXEN SODIUM 550 MG/1
550 TABLET ORAL 2 TIMES DAILY WITH MEALS
Qty: 40 TABLET | Refills: 2 | Status: SHIPPED | OUTPATIENT
Start: 2022-03-17 | End: 2022-12-05

## 2022-03-17 NOTE — TELEPHONE ENCOUNTER
Patient called and expressed concerned with worsened LLQ pain.  Patient was last seen on 3/3/2022 with JTA, and states that they had discussed surgical intervention if no improvement.  She rates pain an 7-8/10 when occurring as it is intermittent.  She was seen with her PCP, and states that they expressed concerns for torsion.  In plan of last visit, patient was advised to RTO x 4 weeks for f/u, with  no scheduled f/u at this time.      Patient wanting to know if we could discuss what her options are with JTA, and she is trying to avoid going to ER due to pain.   Advised patient I would speak with him, and be back with her ASAP.

## 2022-03-17 NOTE — TELEPHONE ENCOUNTER
Called and spoke with patient.  Per JTA: ask if patient has taken Femara, and if so when/ovualtion?  Patient reports that she began taking 3/13.  She denies ovulation.  She has been having intermittent episodes of spotting/light bleeding since LMP 2/11/2022.  She took a UPT on 3/15/2022, that was negative.      Per JTA, Femara can cause cyst.  Patient can come in to office to be seen if she would like, with ultrasound or go to ED if we can not see and feels as if pain is severe.      Patient added to schedule for today, 1 pm U/S and 1:30 appt with JOSEPH.  Patient verified and voiced understanding.  Stated that she may be a few minutes past 1, but is on her way.  Order placed for US

## 2022-03-17 NOTE — PROGRESS NOTES
Chief Complaint   Patient presents with   • Ovarian Cyst         Subjective   HPI  Kat Corado is a 26 y.o. female, , who presents with evaluation of ovarian cyst.      She states she has acute pelvic pain.  She states she has experienced this problem for 1 week.  She describes the severity as severe.  She rates her pain score as a 7/10.  She states that the problem is worsening.  She states the pain is located in the left lower quadrant and it does radiate.  Patient notes aggravating factors include nothing and alleviating factors are tried heating pads and baths not helped.  The patient reports additional symptoms as pelvic pain.  The patient has not previously been evaluated for ovarian cyst...    Did the patient have u/s today? Yes.  Findings showed simple left adnexal cyst adjacent to left ovary measuring 3.1 x 1.9 x 2.3 cm..  I have personally evaluated the U/S and agree with the findings. Mike Little MD    Her last LMP was 2022.  Periods are irregular, lasting 4 days. Patient states last few months periods have been heavier and spotting in between periods. Dysmenorrhea:none.  Partner Status: Marital Status: .  New Partners since last visit: no.  Desires STD Screening: no.    Additional OB/GYN History   Last Pap : 2021 neg    Last Completed Pap Smear          PAP SMEAR (Every 3 Years) Next due on 2021  SCANNED - PAP SMEAR              History of abnormal Pap smear: no  Tobacco Usage?: No   OB History        2    Para        Term                AB   2    Living           SAB   2    IAB        Ectopic        Molar        Multiple        Live Births                    The additional following portions of the patient's history were reviewed and updated as appropriate: allergies, current medications, past family history, past medical history, past social history, past surgical history and problem list.    Review of Systems  All other systems  "reviewed and are negative.     I have reviewed and agree with the HPI, ROS, and historical information as entered above. Mike Little MD    /80   Ht 172.7 cm (68\")   Wt 80.7 kg (178 lb)   BMI 27.06 kg/m²     Physical Exam  Vitals and nursing note reviewed.   Constitutional:       Appearance: She is well-developed.      Comments: Appears uncomfortable   HENT:      Head: Normocephalic and atraumatic.   Cardiovascular:      Rate and Rhythm: Normal rate and regular rhythm.      Heart sounds: Normal heart sounds.   Pulmonary:      Effort: Pulmonary effort is normal.      Breath sounds: Normal breath sounds.   Abdominal:      General: Abdomen is flat. There is no distension.      Palpations: Abdomen is soft. Abdomen is not rigid. There is no mass.      Tenderness: There is abdominal tenderness. There is no guarding or rebound.   Musculoskeletal:      Cervical back: Normal range of motion.   Neurological:      Mental Status: She is alert and oriented to person, place, and time.   Psychiatric:         Behavior: Behavior normal.         Assessment/Plan     Assessment and Plan    Problem List Items Addressed This Visit     Adnexal cyst - Primary    Overview     12/16/2021 ultrasound; left adnexal cyst measuring 3.4 x 2.8 x 2.1 cm with smooth borders.  Appears to be adjacent to normal-appearing left ovary.    Some mild tenderness and fullness on exam lateral left lower pelvic area.  Ultrasound 1/20/2022 with 3.2 x 2.0 x 2.3 cm left adnexal cyst.  Separate from the ovary.    Patient states she had an ultrasound in 2017 at [facility] that showed a 2.5 x 3 cm left adnexal cyst next to the ovary.    3/17/2022; LLQ pain persists and worsened despite ultrasound with slight decrease in size of left adnexal simple cyst.  Wishes to schedule diagnostic laparoscopy, treatment of left adnexal cyst, possible laser of endometriosis           Relevant Orders    External Facility Surgical/Procedural Request    PCOS " (polycystic ovarian syndrome)    Overview     Multiple small ovarian follicles.  Insulin resistance.             Infertility, anovulation    Overview     12/16/21; Restart Clomiphene 50 mg days 3-7.     Rx Provera 10 mg for 10 days.  They can start clomiphene on cycle day 3.    Had light menses post provera. Restarted Clomiphene 50 mg from 1/13/21 to 1/18/21. Will need to increase to 100 mg if does not ovulate.     3/3/2022.Did not ovulate on Clomid 100 mg. Can try Femara 5 mg days 3-7.            Current Assessment & Plan     3/17/2022 ;will abort Femara cycle due to pelvic pain and wishing to schedule surgery.           Left lower quadrant abdominal pain    Overview     1 day history of left lower quadrant pain around the first week of January 2022.  Worse with activity better with rest improved the next day and then resolved.  Denies any GI symptoms; occurred randomly.  May be related to left adnexal cyst.    3/17/2022.  Pain has worsened despite slight decrease in left adnexal cyst size.  Options reviewed and wishes to proceed with surgical evaluation.           Relevant Medications    HYDROcodone-acetaminophen (NORCO) 5-325 MG per tablet    Other Relevant Orders    CBC (No Diff)    HCG, B-subunit, Quantitative    Comprehensive Metabolic Panel    External Facility Surgical/Procedural Request    Dysmenorrhea    Relevant Orders    External Facility Surgical/Procedural Request          1. Lab(s) Ordered  2. Medication(s) Ordered  3. Imaging Ordered   4. Scheduled Diagnostic Laparoscopy.  Schedule pre-op office visit two days prior to surgery with PAT to follow appointment in our office.   5. Derek report has been obtained and reviewed.  CSA obtained.  6. Call if pain or fever increases.  7. Return for preop visit.   8.   Options reviewed and Wishes to schedule diagnostic laparoscopy, treatment of left adnexal cyst, possible laser of endometriosis        Mike Little MD  03/17/2022

## 2022-03-18 LAB
ALBUMIN SERPL-MCNC: 4.4 G/DL (ref 3.9–5)
ALBUMIN/GLOB SERPL: 1.8 {RATIO} (ref 1.2–2.2)
ALP SERPL-CCNC: 103 IU/L (ref 44–121)
ALT SERPL-CCNC: 17 IU/L (ref 0–32)
AST SERPL-CCNC: 20 IU/L (ref 0–40)
BILIRUB SERPL-MCNC: <0.2 MG/DL (ref 0–1.2)
BUN SERPL-MCNC: 15 MG/DL (ref 6–20)
BUN/CREAT SERPL: 19 (ref 9–23)
CALCIUM SERPL-MCNC: 9.2 MG/DL (ref 8.7–10.2)
CHLORIDE SERPL-SCNC: 105 MMOL/L (ref 96–106)
CO2 SERPL-SCNC: 20 MMOL/L (ref 20–29)
CREAT SERPL-MCNC: 0.81 MG/DL (ref 0.57–1)
EGFRCR SERPLBLD CKD-EPI 2021: 103 ML/MIN/1.73
ERYTHROCYTE [DISTWIDTH] IN BLOOD BY AUTOMATED COUNT: 13.9 % (ref 11.7–15.4)
GLOBULIN SER CALC-MCNC: 2.5 G/DL (ref 1.5–4.5)
GLUCOSE SERPL-MCNC: 78 MG/DL (ref 65–99)
HCG INTACT+B SERPL-ACNC: <1 MIU/ML
HCT VFR BLD AUTO: 40.2 % (ref 34–46.6)
HGB BLD-MCNC: 12.8 G/DL (ref 11.1–15.9)
MCH RBC QN AUTO: 28.6 PG (ref 26.6–33)
MCHC RBC AUTO-ENTMCNC: 31.8 G/DL (ref 31.5–35.7)
MCV RBC AUTO: 90 FL (ref 79–97)
PLATELET # BLD AUTO: 279 X10E3/UL (ref 150–450)
POTASSIUM SERPL-SCNC: 4.6 MMOL/L (ref 3.5–5.2)
PROT SERPL-MCNC: 6.9 G/DL (ref 6–8.5)
RBC # BLD AUTO: 4.47 X10E6/UL (ref 3.77–5.28)
SODIUM SERPL-SCNC: 143 MMOL/L (ref 134–144)
WBC # BLD AUTO: 7.6 X10E3/UL (ref 3.4–10.8)

## 2022-03-27 ENCOUNTER — APPOINTMENT (OUTPATIENT)
Dept: PREADMISSION TESTING | Facility: HOSPITAL | Age: 27
End: 2022-03-27

## 2022-04-12 ENCOUNTER — TELEPHONE (OUTPATIENT)
Dept: OBSTETRICS AND GYNECOLOGY | Facility: CLINIC | Age: 27
End: 2022-04-12

## 2022-04-12 NOTE — TELEPHONE ENCOUNTER
Patient called stating that she ovulated with her last cycle on Femara and that was the first time she ovulated in a year and a half. Patient wants to know if she can put off surgery and try one more round of Femara. Patient is on day one of her cycle. Patient verified [facility] as [facility] in [place]. Advised patient that I would speak with Dr. Little and call back with plan.

## 2022-04-13 ENCOUNTER — TELEPHONE (OUTPATIENT)
Dept: OBSTETRICS AND GYNECOLOGY | Facility: CLINIC | Age: 27
End: 2022-04-13

## 2022-04-13 DIAGNOSIS — N97.0 INFERTILITY, ANOVULATION: Primary | ICD-10-CM

## 2022-04-13 RX ORDER — LETROZOLE 2.5 MG/1
TABLET, FILM COATED ORAL
Qty: 10 TABLET | Refills: 0 | Status: SHIPPED | OUTPATIENT
Start: 2022-04-13 | End: 2022-05-12 | Stop reason: SDUPTHER

## 2022-04-13 NOTE — TELEPHONE ENCOUNTER
Notified patient that per Dr. Little patient can try another round of Femara. RX called in. Patient verified pharmacy as [facility] in [place]. Patient V/U.

## 2022-05-12 DIAGNOSIS — N97.0 INFERTILITY, ANOVULATION: ICD-10-CM

## 2022-05-12 RX ORDER — LETROZOLE 2.5 MG/1
TABLET, FILM COATED ORAL
Qty: 10 TABLET | Refills: 0 | Status: SHIPPED | OUTPATIENT
Start: 2022-05-12 | End: 2022-07-08 | Stop reason: SDUPTHER

## 2022-07-07 ENCOUNTER — TELEPHONE (OUTPATIENT)
Dept: OBSTETRICS AND GYNECOLOGY | Facility: CLINIC | Age: 27
End: 2022-07-07

## 2022-07-08 DIAGNOSIS — N97.0 INFERTILITY, ANOVULATION: ICD-10-CM

## 2022-07-08 RX ORDER — LETROZOLE 2.5 MG/1
TABLET, FILM COATED ORAL
Qty: 10 TABLET | Refills: 0 | Status: SHIPPED | OUTPATIENT
Start: 2022-07-08 | End: 2022-09-15

## 2022-08-15 ENCOUNTER — OFFICE VISIT (OUTPATIENT)
Dept: OBSTETRICS AND GYNECOLOGY | Facility: CLINIC | Age: 27
End: 2022-08-15

## 2022-08-15 VITALS
SYSTOLIC BLOOD PRESSURE: 126 MMHG | DIASTOLIC BLOOD PRESSURE: 84 MMHG | WEIGHT: 167.4 LBS | HEIGHT: 68 IN | BODY MASS INDEX: 25.37 KG/M2

## 2022-08-15 DIAGNOSIS — N94.6 DYSMENORRHEA: Primary | ICD-10-CM

## 2022-08-15 PROCEDURE — 99214 OFFICE O/P EST MOD 30 MIN: CPT | Performed by: OBSTETRICS & GYNECOLOGY

## 2022-08-15 RX ORDER — DEXTROAMPHETAMINE SULFATE, DEXTROAMPHETAMINE SACCHARATE, AMPHETAMINE SULFATE AND AMPHETAMINE ASPARTATE 5; 5; 5; 5 MG/1; MG/1; MG/1; MG/1
20 CAPSULE, EXTENDED RELEASE ORAL EVERY MORNING
COMMUNITY
Start: 2022-06-29 | End: 2022-12-05

## 2022-08-15 RX ORDER — BUSPIRONE HYDROCHLORIDE 10 MG/1
TABLET ORAL
COMMUNITY
Start: 2022-07-29 | End: 2022-08-15

## 2022-08-15 RX ORDER — QUETIAPINE FUMARATE 50 MG/1
TABLET, FILM COATED ORAL
COMMUNITY
Start: 2022-07-03 | End: 2022-09-15

## 2022-08-15 RX ORDER — POLYETHYLENE GLYCOL 3350 17 G/17G
POWDER, FOR SOLUTION ORAL
COMMUNITY
End: 2022-08-15

## 2022-08-15 RX ORDER — OXCARBAZEPINE 600 MG/1
600 TABLET, FILM COATED ORAL DAILY
COMMUNITY
Start: 2022-07-20 | End: 2022-09-27

## 2022-08-15 RX ORDER — VENLAFAXINE HYDROCHLORIDE 150 MG/1
225 CAPSULE, EXTENDED RELEASE ORAL
COMMUNITY
End: 2022-08-15

## 2022-08-15 NOTE — PROGRESS NOTES
Chief Complaint   Patient presents with   • Follow-up     Discuss surgery   Dysmenorrhea    Subjective   HPI  Eileen Corado is a 26 y.o. female, . Her last LMP was Patient's last menstrual period was 2022.. who presents to discuss diagnostic laparoscopy, treatment of left adnexal cyst, and possible laser of endometriosis. Pt was seeing Dr Little and would like to discuss the findings and also if surgery would increase her chances of getting pregnant as she has had fertility issues and the cyst is on her fallopian tube. Pt has taken femara and clomid w/o success.    Reviewed prior US and discussed findings and impression.     Discussed clinical picture after review of prior pregnancy/menstrual history.     Reviewed prior plan and agree with Dr. Little assessment.     Additional OB/GYN History     Last Pap : 21  Last Completed Pap Smear          PAP SMEAR (Every 3 Years) Next due on 2021  SCANNED - PAP SMEAR                Last mammogram: n/a  Last Completed Mammogram     This patient has no relevant Health Maintenance data.          Tobacco Usage?: No   OB History        2    Para        Term                AB   2    Living           SAB   2    IAB        Ectopic        Molar        Multiple        Live Births                      Current Outpatient Medications:   •  Adderall XR 20 MG 24 hr capsule, , Disp: , Rfl:   •  ALPRAZolam (XANAX) 0.5 MG tablet, , Disp: , Rfl:   •  amphetamine-dextroamphetamine (ADDERALL) 7.5 MG tablet, , Disp: , Rfl:   •  metFORMIN ER (Glucophage XR) 500 MG 24 hr tablet, Take 3 tablets by mouth Every Night for 180 days., Disp: 90 tablet, Rfl: 5  •  naproxen sodium (Anaprox DS) 550 MG tablet, Take 1 tablet by mouth 2 (Two) Times a Day With Meals. PRN, Disp: 40 tablet, Rfl: 2  •  OXcarbazepine (TRILEPTAL) 600 MG tablet, , Disp: , Rfl:   •  Prenatal Vit-Fe Fumarate-FA ( Plus) 27-1 MG tablet, Take 1 tablet by  "mouth Daily., Disp: 30 tablet, Rfl: 11  •  QUEtiapine (SEROquel) 100 MG tablet, , Disp: , Rfl:   •  QUEtiapine (SEROquel) 50 MG tablet, , Disp: , Rfl:   •  letrozole (Femara) 2.5 MG tablet, Take 2 tablets by mouth daily for 5 days on days 3-7 of menstrual cycle., Disp: 10 tablet, Rfl: 0  •  propranolol (INDERAL) 40 MG tablet, Take 5 mg by mouth 2 (Two) Times a Day., Disp: , Rfl:      Past Medical History:   Diagnosis Date   • Anxiety    • Depression    • Endometriosis    • Female infertility    • Hyperlipemia    • Hypertension    • Ovarian cyst    • Polycystic ovary syndrome    • PONV (postoperative nausea and vomiting)    • Recurrent pregnancy loss, antepartum condition or complication         Past Surgical History:   Procedure Laterality Date   • FAT GRAFTING      left heel   • KNEE MENISCAL REPAIR      right and left   • TONSILLECTOMY AND ADENOIDECTOMY     • WISDOM TOOTH EXTRACTION         The additional following portions of the patient's history were reviewed and updated as appropriate: allergies, current medications, past family history, past medical history, past social history and past surgical history.    Review of Systems   Constitutional: Negative.    Respiratory: Negative.    Cardiovascular: Negative.    Genitourinary: Positive for dyspareunia, menstrual problem and pelvic pain. Negative for breast discharge, breast lump and breast pain.   Musculoskeletal: Negative.    Neurological: Negative.    Psychiatric/Behavioral: Negative.        I have reviewed and agree with the HPI, ROS, and historical information as entered above. Lousi Velasco MD    Objective   /84   Ht 172.7 cm (68\")   Wt 75.9 kg (167 lb 6.4 oz)   LMP 08/05/2022   Breastfeeding No   BMI 25.45 kg/m²     Physical Exam  Vitals reviewed.   Constitutional:       Appearance: Normal appearance. She is normal weight.   HENT:      Head: Normocephalic and atraumatic.   Abdominal:      General: Abdomen is flat. Bowel sounds are " normal.      Palpations: Abdomen is soft.   Skin:     General: Skin is warm and dry.   Neurological:      Mental Status: She is alert and oriented to person, place, and time.   Psychiatric:         Mood and Affect: Mood normal.         Behavior: Behavior normal.         Assessment & Plan     Assessment     Problem List Items Addressed This Visit        Genitourinary and Reproductive     Dysmenorrhea - Primary          1. Dysmenorrhea    Plan     Return if symptoms worsen or fail to improve, for Reschedule planned surgery with Dr. Little later this month or next. Reached out to Lucina.      Louis Velasco MD  08/15/2022

## 2022-08-29 ENCOUNTER — TELEPHONE (OUTPATIENT)
Dept: OBSTETRICS AND GYNECOLOGY | Facility: CLINIC | Age: 27
End: 2022-08-29

## 2022-08-29 NOTE — TELEPHONE ENCOUNTER
Pt called wanting to talk to Adelia about surgery scheduling questions. Provided pt with [facility] phone number.

## 2022-09-15 ENCOUNTER — OFFICE VISIT (OUTPATIENT)
Dept: OBSTETRICS AND GYNECOLOGY | Facility: CLINIC | Age: 27
End: 2022-09-15

## 2022-09-15 VITALS
WEIGHT: 159.6 LBS | BODY MASS INDEX: 24.19 KG/M2 | SYSTOLIC BLOOD PRESSURE: 120 MMHG | DIASTOLIC BLOOD PRESSURE: 84 MMHG | HEIGHT: 68 IN

## 2022-09-15 DIAGNOSIS — R10.32 LEFT LOWER QUADRANT ABDOMINAL PAIN: ICD-10-CM

## 2022-09-15 DIAGNOSIS — N94.9 ADNEXAL CYST: ICD-10-CM

## 2022-09-15 DIAGNOSIS — N94.6 DYSMENORRHEA: Primary | ICD-10-CM

## 2022-09-15 PROCEDURE — 99213 OFFICE O/P EST LOW 20 MIN: CPT | Performed by: OBSTETRICS & GYNECOLOGY

## 2022-09-15 RX ORDER — AMITRIPTYLINE HYDROCHLORIDE 25 MG/1
TABLET, FILM COATED ORAL
COMMUNITY
Start: 2022-08-30 | End: 2022-12-05

## 2022-09-15 RX ORDER — VILAZODONE HYDROCHLORIDE 20 MG/1
TABLET ORAL
COMMUNITY
Start: 2022-09-01 | End: 2022-11-10

## 2022-09-15 NOTE — PROGRESS NOTES
Gynecologic Preoperative Exam Note        Subjective   Eileen Corado is a 26 y.o. year old  who is scheduled for Diagnostic Laparoscopy with possible laser of endometriosis and treatment of adnexal cyst at [facility] on 2022 at 0900.  Her pre operative diagnosis is Dysmenorrhea and Symptomatic Endometriosis. .Patient's last menstrual period was 2022 (exact date). Her birth control method is no method at present time. Patient reports she has been trying to get pregnant. Her BMI is:       Patient inquiring if this surgery will increase her chances of being able to conceive. Patient reports that she has tried Clomid and Femara in the past with no success. Patient reports that her spouse has had a SA in the past and was normal. Patient reports that she has not seen a fertility specialist. Patient reports they have been trying to conceive now for over a year.     She has reviewed the informational pamphlet on laparoscopy.    She understands the risks of bleeding, infection, possible damage to other organ systems, including but not limited to the gastrointestinal tract and genitourinary tract.  She also understands the specific risks listed in the preop information (video, pamphlets, etc.).    She has reviewed and signed the preop consent form.    She has been instructed to have a light dinner the night before surgery, then nothing to eat or drink after midnight.  The day of surgery do not chew gum or smoke.  Remove all jewelry, nail polish, contact lenses prior to coming to the hospital.  Do not bring valuables or large sums of money with you. Patient was instructed on what time to arrive and where to check in, maps were given.  She was instructed that she will meet an Anesthesiologist and that an IV will be started to provide fluids and sedation.  The total time of procedure was discussed.  She was instructed that she will need a .      Allergies   Allergen Reactions   • Other  "Swelling     \"blue stitches\" - patient reports that is causes the area to swell and drain pus.      She has confirmed that she is not allergic to Latex.     She is on the following medications. These were reviewed with the patient today and instructed on which medications are ok to take with a sip of water prior to the surgery.      Current Outpatient Medications:   •  Adderall XR 20 MG 24 hr capsule, Take 20 mg by mouth Every Morning, Disp: , Rfl:   •  ALPRAZolam (XANAX) 0.5 MG tablet, Take 0.5 mg by mouth Daily As Needed., Disp: , Rfl:   •  amphetamine-dextroamphetamine (ADDERALL) 7.5 MG tablet, Take 7.5 mg by mouth Daily., Disp: , Rfl:   •  metFORMIN ER (Glucophage XR) 500 MG 24 hr tablet, Take 3 tablets by mouth Every Night for 180 days., Disp: 90 tablet, Rfl: 5  •  naproxen sodium (Anaprox DS) 550 MG tablet, Take 1 tablet by mouth 2 (Two) Times a Day With Meals. PRN, Disp: 40 tablet, Rfl: 2  •  OXcarbazepine (TRILEPTAL) 600 MG tablet, Take 600 mg by mouth Daily., Disp: , Rfl:   •  propranolol (INDERAL) 40 MG tablet, Patient does not recall dose, Disp: , Rfl:   •  QUEtiapine (SEROquel) 100 MG tablet, Take 50 mg by mouth 2 (Two) Times a Day., Disp: , Rfl:   •  vilazodone (VIIBRYD) 20 MG tablet tablet, , Disp: , Rfl:   •  amitriptyline (ELAVIL) 25 MG tablet, Patient reports she will start taking this when she finishes her seroquel, Disp: , Rfl:      Past Medical History:   Diagnosis Date   • Anxiety    • Depression    • Endometriosis    • Female infertility    • Hyperlipemia    • Hypertension    • Ovarian cyst    • Polycystic ovary syndrome    • PONV (postoperative nausea and vomiting)    • Recurrent pregnancy loss, antepartum condition or complication      Past Surgical History:   Procedure Laterality Date   • FAT GRAFTING      left heel   • KNEE MENISCAL REPAIR      right and left   • TONSILLECTOMY AND ADENOIDECTOMY     • WISDOM TOOTH EXTRACTION       OB History    Para Term  AB Living   2 0 0 0 2 " 0   SAB IAB Ectopic Molar Multiple Live Births   2 0 0 0 0 0      # Outcome Date GA Lbr Myke/2nd Weight Sex Delivery Anes PTL Lv   2 SAB 2021           1 SAB 2017             Social History     Tobacco Use   Smoking Status Never Smoker   Smokeless Tobacco Never Used     Social History     Substance and Sexual Activity   Alcohol Use Never     Social History     Substance and Sexual Activity   Drug Use Never         Review of Systems   Constitutional: Negative for chills and fatigue.   Respiratory: Negative.    Cardiovascular: Negative.    Gastrointestinal: Positive for abdominal pain. Negative for abdominal distention.   Genitourinary: Negative for dysuria, urgency, vaginal bleeding, vaginal discharge and vaginal pain.   Psychiatric/Behavioral: Negative for decreased concentration and dysphoric mood.           Objective    Vitals:    09/15/22 1535   BP: 120/84         Physical Exam  Vitals and nursing note reviewed.   Constitutional:       Appearance: Normal appearance.   HENT:      Head: Normocephalic and atraumatic.   Cardiovascular:      Rate and Rhythm: Normal rate and regular rhythm.      Heart sounds: Normal heart sounds.   Pulmonary:      Effort: Pulmonary effort is normal.      Breath sounds: Normal breath sounds.   Abdominal:      General: Abdomen is flat. There is no distension.      Palpations: Abdomen is soft. There is no mass.      Tenderness: There is no abdominal tenderness. There is no guarding.      Hernia: No hernia is present.   Neurological:      Mental Status: She is alert and oriented to person, place, and time.   Psychiatric:         Behavior: Behavior normal.         Assessment   Problem List Items Addressed This Visit     Adnexal cyst    Overview     12/16/2021 ultrasound; left adnexal cyst measuring 3.4 x 2.8 x 2.1 cm with smooth borders.  Appears to be adjacent to normal-appearing left ovary.    Some mild tenderness and fullness on exam lateral left lower pelvic area.  Ultrasound 1/20/2022  with 3.2 x 2.0 x 2.3 cm left adnexal cyst.  Separate from the ovary.    Patient states she had an ultrasound in 2017 at [facility] that showed a 2.5 x 3 cm left adnexal cyst next to the ovary.    3/17/2022; LLQ pain persists and worsened despite ultrasound with slight decrease in size of left adnexal simple cyst.  Wishes to schedule diagnostic laparoscopy, treatment of left adnexal cyst, possible laser of endometriosis    9/15/22; U/s stable left adnexal cyst; 3.1 x 1.7 x 3.0 cm adjacent to left ovary.  May be paratubal cyst.  Preop for diagnostic laparoscopy possible laser of endometriosis and treatment of left adnexal cyst.         Relevant Orders    US Non-ob Transvaginal    HCG, B-subunit, Quantitative    CBC (No Diff)    Basic Metabolic Panel    Left lower quadrant abdominal pain    Overview     1 day history of left lower quadrant pain around the first week of January 2022.  Worse with activity better with rest improved the next day and then resolved.  Denies any GI symptoms; occurred randomly.  May be related to left adnexal cyst.    3/17/2022.  Pain has worsened despite slight decrease in left adnexal cyst size.  Options reviewed and wishes to proceed with surgical evaluation.         Dysmenorrhea - Primary                   Plan   1. Preop for diagnostic laparoscopy with possible laser endometriosis and treatment of adnexal cyst.  Patient requests chromotubation at that time.  2. Risks of surgery were reviewed with the patient including risks of bleeding, infection, damage to other organ systems including, but not limited to GI and  tracts (bowel, bladder, blood vessels, nerves) risks of Anesthesia, as well as the risk the surgery will not produce the desired results, possible need for additional surgery, death, risk of uterine perforation.  3. PAT Scheduled    4. Derek has been obtained and reviewed   5. Pain Medication Consent Form has been signed.  A review regarding proper medication  administration, impact on driving and working while medicated, the safety of use in pregnancy, the potential for overdose and the proper disposal and storage of controlled medications has been done with the patient.          Mike Little MD  9/15/2022

## 2022-09-16 LAB
BUN SERPL-MCNC: 11 MG/DL (ref 6–20)
BUN/CREAT SERPL: 12 (ref 9–23)
CALCIUM SERPL-MCNC: 9.7 MG/DL (ref 8.7–10.2)
CHLORIDE SERPL-SCNC: 102 MMOL/L (ref 96–106)
CO2 SERPL-SCNC: 21 MMOL/L (ref 20–29)
CREAT SERPL-MCNC: 0.89 MG/DL (ref 0.57–1)
EGFRCR SERPLBLD CKD-EPI 2021: 92 ML/MIN/1.73
ERYTHROCYTE [DISTWIDTH] IN BLOOD BY AUTOMATED COUNT: 13.8 % (ref 11.7–15.4)
GLUCOSE SERPL-MCNC: 84 MG/DL (ref 65–99)
HCG INTACT+B SERPL-ACNC: <1 MIU/ML
HCT VFR BLD AUTO: 37.3 % (ref 34–46.6)
HGB BLD-MCNC: 12.6 G/DL (ref 11.1–15.9)
MCH RBC QN AUTO: 29.4 PG (ref 26.6–33)
MCHC RBC AUTO-ENTMCNC: 33.8 G/DL (ref 31.5–35.7)
MCV RBC AUTO: 87 FL (ref 79–97)
PLATELET # BLD AUTO: 251 X10E3/UL (ref 150–450)
POTASSIUM SERPL-SCNC: 4.2 MMOL/L (ref 3.5–5.2)
RBC # BLD AUTO: 4.28 X10E6/UL (ref 3.77–5.28)
SODIUM SERPL-SCNC: 138 MMOL/L (ref 134–144)
WBC # BLD AUTO: 6.2 X10E3/UL (ref 3.4–10.8)

## 2022-09-16 RX ORDER — METFORMIN HYDROCHLORIDE 500 MG/1
1500 TABLET, EXTENDED RELEASE ORAL NIGHTLY
Qty: 90 TABLET | Refills: 0 | Status: SHIPPED | OUTPATIENT
Start: 2022-09-16 | End: 2022-10-27 | Stop reason: SDUPTHER

## 2022-09-20 ENCOUNTER — OUTSIDE FACILITY SERVICE (OUTPATIENT)
Dept: OBSTETRICS AND GYNECOLOGY | Facility: CLINIC | Age: 27
End: 2022-09-20

## 2022-09-20 ENCOUNTER — LAB REQUISITION (OUTPATIENT)
Dept: LAB | Facility: HOSPITAL | Age: 27
End: 2022-09-20

## 2022-09-20 DIAGNOSIS — N94.9 UNSPECIFIED CONDITION ASSOCIATED WITH FEMALE GENITAL ORGANS AND MENSTRUAL CYCLE: ICD-10-CM

## 2022-09-20 DIAGNOSIS — N94.9 ADNEXAL CYST: Primary | ICD-10-CM

## 2022-09-20 PROCEDURE — 88304 TISSUE EXAM BY PATHOLOGIST: CPT | Performed by: OBSTETRICS & GYNECOLOGY

## 2022-09-20 PROCEDURE — 58662 LAPAROSCOPY EXCISE LESIONS: CPT | Performed by: OBSTETRICS & GYNECOLOGY

## 2022-09-20 RX ORDER — IBUPROFEN 600 MG/1
600 TABLET ORAL EVERY 6 HOURS PRN
Qty: 30 TABLET | Refills: 0 | Status: SHIPPED | OUTPATIENT
Start: 2022-09-20 | End: 2022-11-10

## 2022-09-20 RX ORDER — ONDANSETRON 4 MG/1
4 TABLET, ORALLY DISINTEGRATING ORAL EVERY 8 HOURS PRN
Qty: 6 TABLET | Refills: 0 | Status: SHIPPED | OUTPATIENT
Start: 2022-09-20 | End: 2022-11-10

## 2022-09-20 RX ORDER — HYDROCODONE BITARTRATE AND ACETAMINOPHEN 5; 325 MG/1; MG/1
1-2 TABLET ORAL EVERY 4 HOURS PRN
Qty: 10 TABLET | Refills: 0 | Status: SHIPPED | OUTPATIENT
Start: 2022-09-20 | End: 2022-09-25

## 2022-09-21 LAB
CYTO UR: NORMAL
LAB AP CASE REPORT: NORMAL
LAB AP CLINICAL INFORMATION: NORMAL
PATH REPORT.FINAL DX SPEC: NORMAL
PATH REPORT.GROSS SPEC: NORMAL

## 2022-09-22 ENCOUNTER — TELEPHONE (OUTPATIENT)
Dept: OBSTETRICS AND GYNECOLOGY | Facility: CLINIC | Age: 27
End: 2022-09-22

## 2022-09-22 NOTE — TELEPHONE ENCOUNTER
Pt left VM, states her glue fell off one of her incisions in the shower.     Returned pt's call, she reports that she has surgery with [facility] on Tuesday, pt states that one of her incision sites the glue fell off when she was in the shower. She denies fevers, warmth, redness, or discharge from the site. Informed pt that is okay and to monitor for signs of infection, warmth, redness, fevers, or discharge. Pt verbalizes understanding.

## 2022-09-26 ENCOUNTER — TELEPHONE (OUTPATIENT)
Dept: OBSTETRICS AND GYNECOLOGY | Facility: CLINIC | Age: 27
End: 2022-09-26

## 2022-09-27 ENCOUNTER — TELEPHONE (OUTPATIENT)
Dept: OBSTETRICS AND GYNECOLOGY | Facility: CLINIC | Age: 27
End: 2022-09-27

## 2022-09-27 ENCOUNTER — OFFICE VISIT (OUTPATIENT)
Dept: OBSTETRICS AND GYNECOLOGY | Facility: CLINIC | Age: 27
End: 2022-09-27

## 2022-09-27 VITALS
TEMPERATURE: 99 F | DIASTOLIC BLOOD PRESSURE: 88 MMHG | SYSTOLIC BLOOD PRESSURE: 128 MMHG | WEIGHT: 159 LBS | HEIGHT: 68 IN | BODY MASS INDEX: 24.1 KG/M2

## 2022-09-27 DIAGNOSIS — Z09 POSTOPERATIVE FOLLOW-UP: Primary | ICD-10-CM

## 2022-09-27 PROCEDURE — 99024 POSTOP FOLLOW-UP VISIT: CPT | Performed by: NURSE PRACTITIONER

## 2022-09-27 RX ORDER — DOXYCYCLINE HYCLATE 100 MG/1
CAPSULE ORAL
COMMUNITY
Start: 2022-09-14 | End: 2022-11-10

## 2022-09-27 RX ORDER — HYDROCODONE BITARTRATE AND ACETAMINOPHEN 5; 325 MG/1; MG/1
1 TABLET ORAL EVERY 6 HOURS PRN
Qty: 12 TABLET | Refills: 0 | Status: SHIPPED | OUTPATIENT
Start: 2022-09-27 | End: 2022-09-30

## 2022-09-27 RX ORDER — CEPHALEXIN 500 MG/1
500 CAPSULE ORAL 2 TIMES DAILY
Qty: 14 CAPSULE | Refills: 0 | Status: SHIPPED | OUTPATIENT
Start: 2022-09-27 | End: 2022-10-04

## 2022-09-27 NOTE — TELEPHONE ENCOUNTER
"Patient called stating that she had surgery on 09/20/2022 with Dr. Little. Patient had diagnostic lap, excision of adnexal cyst and chomotubation. Patient reports that she is having some sharp pain in her LLQ that is worse with walking. Patient reports it is super painful to the touch. Patient reports that she has some \"gunk\" and yellow discharge coming from her umbilical incision site. Patient reports the area is reddened around the incision. Patient denies any fever. Patient reports that she has tried ibuprofen, Tylenol and a heating pad with minimal relief. Patient advised to come in to office to be evaluated. Appt made. Patient V/U.   "

## 2022-09-27 NOTE — PROGRESS NOTES
"     OBGYN Postoperative Exam Note          Subjective   Chief Complaint   Patient presents with   • Post-op     Eileen Gabriela Corado is a 26 y.o. year old  presenting to be seen for her post-operative visit. She is S/P diagnostic laparoscopy, excision of adnexal cyst, and chromotubation on 22 at [facility] for Dysmenorrhea and LLQ abdominal pain, and left adnexal cyst. Currently she reports that she is having some sharp pain in her LLQ that is worse with walking. Patient reports it is super painful to the touch. Patient reports that she has some \"gunk\" and yellow discharge coming from her umbilical incision site. Patient reports the area is reddened around the incision. Patient denies any fever to her knowledge. Patient reports that she has tried ibuprofen, Tylenol and a heating pad with minimal relief. Temp in office 99.0    The pathology results from her procedure are in Eileen's record and are benign paratubal cyst.     OTHER THINGS SHE WANTS TO DISCUSS TODAY:  Nothing else      Current Outpatient Medications:   •  Adderall XR 20 MG 24 hr capsule, Take 20 mg by mouth Every Morning, Disp: , Rfl:   •  ALPRAZolam (XANAX) 0.5 MG tablet, Take 0.5 mg by mouth Daily As Needed., Disp: , Rfl:   •  amitriptyline (ELAVIL) 25 MG tablet, Patient reports she will start taking this when she finishes her seroquel, Disp: , Rfl:   •  amphetamine-dextroamphetamine (ADDERALL) 7.5 MG tablet, Take 7.5 mg by mouth Daily., Disp: , Rfl:   •  doxycycline (VIBRAMYCIN) 100 MG capsule, , Disp: , Rfl:   •  ibuprofen (ADVIL,MOTRIN) 600 MG tablet, Take 1 tablet by mouth Every 6 (Six) Hours As Needed for Mild Pain. Take with food, Disp: 30 tablet, Rfl: 0  •  metFORMIN ER (Glucophage XR) 500 MG 24 hr tablet, Take 3 tablets by mouth Every Night for 180 days., Disp: 90 tablet, Rfl: 0  •  naproxen sodium (Anaprox DS) 550 MG tablet, Take 1 tablet by mouth 2 (Two) Times a Day With Meals. PRN, Disp: 40 tablet, Rfl: 2  •  ondansetron " "ODT (Zofran ODT) 4 MG disintegrating tablet, Place 1 tablet on the tongue Every 8 (Eight) Hours As Needed for Nausea or Vomiting., Disp: 6 tablet, Rfl: 0  •  OXcarbazepine (TRILEPTAL) 600 MG tablet, Take 600 mg by mouth Daily., Disp: , Rfl:   •  propranolol (INDERAL) 40 MG tablet, Patient does not recall dose, Disp: , Rfl:   •  QUEtiapine (SEROquel) 100 MG tablet, Take 50 mg by mouth 2 (Two) Times a Day., Disp: , Rfl:   •  vilazodone (VIIBRYD) 20 MG tablet tablet, , Disp: , Rfl:      Past Medical History:   Diagnosis Date   • Anxiety    • Depression    • Endometriosis    • Female infertility    • Hyperlipemia    • Hypertension    • Ovarian cyst    • Polycystic ovary syndrome    • PONV (postoperative nausea and vomiting)    • Recurrent pregnancy loss, antepartum condition or complication         Past Surgical History:   Procedure Laterality Date   • FAT GRAFTING      left heel   • KNEE MENISCAL REPAIR      right and left   • PELVIC LAPAROSCOPY     • TONSILLECTOMY AND ADENOIDECTOMY     • WISDOM TOOTH EXTRACTION         The following portions of the patient's history were reviewed and updated as appropriate:current medications and allergies    Review of Systems   Gastrointestinal: Positive for abdominal pain.   Genitourinary: Positive for pelvic pain ( LLQ).          Objective   Ht 172.7 cm (68\")   Wt 72.1 kg (159 lb)   LMP 09/05/2022 (Exact Date)   Breastfeeding No   BMI 24.18 kg/m²     Physical Exam  Vitals and nursing note reviewed.   Constitutional:       Appearance: Normal appearance. She is normal weight.   Abdominal:      Tenderness: There is abdominal tenderness in the left lower quadrant.          Comments: Incision at umbilicus slightly red, draining scant about of yellow tinged discharge. Area was cleaned with hydrogen peroxide/ normal saline and triple antibiotic ointment applied to that incision. The other incision appears normal.    Neurological:      Mental Status: She is alert.            "   Assessment   1. S/P Diagnostic Lap, excision of left paratubal cyst, and chromotubation     Plan   1. Continues to have LLQ pain unrelieved by Ibuprofen. Encouraged patient to increase rest because she may be over doing it at work, since she is just 6 days post op. Rx Norco given  2. The importance of keeping all planned follow-up and taking all medications as prescribed was emphasized.  3. Incision cleaned and triple antibiotic ointment applied. Rx keflex as directed. Avoid any type of heavy lifting  4. Keep next post op appt. As scheduled with Dr. Little. Call if continues to have LLQ pain or incision area worsens. Kelfex Rx may not be necessary, but just ordering as a precaution.              Agnes Waldrop MA  09/27/2022

## 2022-10-13 ENCOUNTER — OFFICE VISIT (OUTPATIENT)
Dept: OBSTETRICS AND GYNECOLOGY | Facility: CLINIC | Age: 27
End: 2022-10-13

## 2022-10-13 VITALS
BODY MASS INDEX: 23.73 KG/M2 | DIASTOLIC BLOOD PRESSURE: 98 MMHG | SYSTOLIC BLOOD PRESSURE: 138 MMHG | HEIGHT: 68 IN | WEIGHT: 156.6 LBS

## 2022-10-13 DIAGNOSIS — E88.81 INSULIN RESISTANCE: Primary | ICD-10-CM

## 2022-10-13 DIAGNOSIS — N97.0 INFERTILITY, ANOVULATION: ICD-10-CM

## 2022-10-13 DIAGNOSIS — N83.8 PARATUBAL CYST: ICD-10-CM

## 2022-10-13 PROCEDURE — 99024 POSTOP FOLLOW-UP VISIT: CPT | Performed by: OBSTETRICS & GYNECOLOGY

## 2022-10-13 RX ORDER — CEPHALEXIN 500 MG/1
500 CAPSULE ORAL 3 TIMES DAILY
Qty: 20 CAPSULE | Refills: 0 | Status: SHIPPED | OUTPATIENT
Start: 2022-10-13 | End: 2022-10-23

## 2022-10-13 RX ORDER — LETROZOLE 2.5 MG/1
5 TABLET, FILM COATED ORAL DAILY
Qty: 10 TABLET | Refills: 0 | Status: SHIPPED | OUTPATIENT
Start: 2022-10-13 | End: 2022-10-18

## 2022-10-13 NOTE — PROGRESS NOTES
OBGYN Postoperative Exam Note          Subjective   Chief Complaint   Patient presents with   • Post-op     Eileen Ochoa Mickie is a 26 y.o. year old  presenting to be seen for her post-operative visit. She is S/P diagnostic laparoscopy with excision of left adnexal cyst and chromotubation on 22 at [facility] for Dysmenorrhea, left lower quadrant abdominal pain and left paratubal cyst. Currently she reports pain is well controlled and wound drainage blood tinged. She reports the incision at her umbilicus bleeds frequently. She denies redness around the incision, swelling or fever. She reports she came in and saw Ayanna of 22 and was given an antibiotic Keflex for it. Denies issues with bowel or bladder.     The pathology results from her procedure are in Eileen's record and are benign.      OTHER THINGS SHE WANTS TO DISCUSS TODAY:  Nothing else      Current Outpatient Medications:   •  Adderall XR 20 MG 24 hr capsule, Take 20 mg by mouth Every Morning, Disp: , Rfl:   •  ALPRAZolam (XANAX) 0.5 MG tablet, Take 0.5 mg by mouth Daily As Needed., Disp: , Rfl:   •  amitriptyline (ELAVIL) 25 MG tablet, Patient reports she will start taking this when she finishes her seroquel, Disp: , Rfl:   •  amphetamine-dextroamphetamine (ADDERALL) 7.5 MG tablet, Take 7.5 mg by mouth Daily., Disp: , Rfl:   •  cephalexin (Keflex) 500 MG capsule, Take 1 capsule by mouth 3 (Three) Times a Day for 10 days., Disp: 20 capsule, Rfl: 0  •  doxycycline (VIBRAMYCIN) 100 MG capsule, , Disp: , Rfl:   •  ibuprofen (ADVIL,MOTRIN) 600 MG tablet, Take 1 tablet by mouth Every 6 (Six) Hours As Needed for Mild Pain. Take with food, Disp: 30 tablet, Rfl: 0  •  letrozole (Femara) 2.5 MG tablet, Take 2 tablets by mouth Daily for 5 days. On days 3-7 of menstrual cycle., Disp: 10 tablet, Rfl: 0  •  metFORMIN ER (Glucophage XR) 500 MG 24 hr tablet, Take 3 tablets by mouth Every Night for 180 days., Disp: 90 tablet, Rfl: 0  •  naproxen  "sodium (Anaprox DS) 550 MG tablet, Take 1 tablet by mouth 2 (Two) Times a Day With Meals. PRN, Disp: 40 tablet, Rfl: 2  •  ondansetron ODT (Zofran ODT) 4 MG disintegrating tablet, Place 1 tablet on the tongue Every 8 (Eight) Hours As Needed for Nausea or Vomiting., Disp: 6 tablet, Rfl: 0  •  QUEtiapine (SEROquel) 100 MG tablet, Take 50 mg by mouth 2 (Two) Times a Day., Disp: , Rfl:   •  vilazodone (VIIBRYD) 20 MG tablet tablet, , Disp: , Rfl:      Past Medical History:   Diagnosis Date   • Anxiety    • Depression    • Endometriosis    • Female infertility    • Hyperlipemia    • Hypertension    • Ovarian cyst    • Polycystic ovary syndrome    • PONV (postoperative nausea and vomiting)    • Recurrent pregnancy loss, antepartum condition or complication         Past Surgical History:   Procedure Laterality Date   • DIAGNOSTIC LAPAROSCOPY  09/20/2022    diagnostic lap with excision of left paratubal cyst, chromotubation   • FAT GRAFTING      left heel   • KNEE MENISCAL REPAIR      right and left   • TONSILLECTOMY AND ADENOIDECTOMY     • WISDOM TOOTH EXTRACTION         The following portions of the patient's history were reviewed and updated as appropriate:current medications and allergies    Review of Systems   Constitutional: Negative.    HENT: Negative.    Eyes: Negative.    Respiratory: Negative.    Cardiovascular: Negative.    Gastrointestinal: Negative.    Endocrine: Negative.    Genitourinary: Negative.    Musculoskeletal: Negative.    Skin:        umbilical lap site with bloody drainage   Allergic/Immunologic: Negative.    Neurological: Negative.    Hematological: Negative.    Psychiatric/Behavioral: Negative.           Objective   /98   Ht 172.7 cm (68\")   Wt 71 kg (156 lb 9.6 oz)   LMP 10/09/2022 (Exact Date)   BMI 23.81 kg/m²     Physical Exam    Problem List Items Addressed This Visit     Insulin resistance - Primary    Overview     Fasting glucose was normal. Fasting insulin was 33.  3/2021 begun " Metformin ER and increase to 1500 mg nightly         Left Paratubal cyst    Overview     12/16/2021 ultrasound; left adnexal cyst measuring 3.4 x 2.8 x 2.1 cm with smooth borders.  Appears to be adjacent to normal-appearing left ovary.    Some mild tenderness and fullness on exam lateral left lower pelvic area.  Ultrasound 1/20/2022 with 3.2 x 2.0 x 2.3 cm left adnexal cyst.  Separate from the ovary.    Patient states she had an ultrasound in 2017 at [facility] that showed a 2.5 x 3 cm left adnexal cyst next to the ovary.    3/17/2022; LLQ pain persists and worsened despite ultrasound with slight decrease in size of left adnexal simple cyst.  Wishes to schedule diagnostic laparoscopy, treatment of left adnexal cyst, possible laser of endometriosis    9/15/22; U/s stable left adnexal cyst; 3.1 x 1.7 x 3.0 cm adjacent to left ovary.  May be paratubal cyst.  Preop for diagnostic laparoscopy possible laser of endometriosis and treatment of left adnexal cyst.    9/28/2022 laparoscopic left paratubal cystectomy and chromopertubation.  Cyst was a benign paratubal cyst.  Both tubes and ovaries appeared normal.  No evidence of endometriosis.  Free fill and spill of dye from the right tube.  No fill or spill of dye from the left tube.              Infertility, anovulation    Overview     12/16/21; Restart Clomiphene 50 mg days 3-7.     Rx Provera 10 mg for 10 days.  They can start clomiphene on cycle day 3.    Had light menses post provera. Restarted Clomiphene 50 mg from 1/13/21 to 1/18/21. Will need to increase to 100 mg if does not ovulate.     3/3/2022.Did not ovulate on Clomid 100 mg. Can try Femara 5 mg days 3-7.     10/13/2022.  Patient states she is has sporadic ovulation usually day 14 through 16.  Wishes to restart Femara 5 mg days 3 through 7.  She is to chart ovulation tests on body basal by temperature chart.             Assessment   1. Doing well status post laparoscopic left paratubal cystectomy and  chromopertubation.  Cyst was a benign paratubal cyst.  Both tubes and ovaries appeared normal.  No evidence of endometriosis.  Free fill and spill of dye from the right tube.  No fill or spill of dye from the left tube.  2. Clear drainage from umbilical incision intermittently.  Rx Keflex 500 mg 3 times daily for 10 days sent to pharmacy.  3. Infertility with history of infrequent ovulation.  Wishes to restart Femara 5 mg.  Days 3 through 7 of cycle.     Plan   1. Continue to clean umbilical incision with hydrogen peroxide and apply Neosporin.  2. The importance of keeping all planned follow-up and taking all medications as prescribed was emphasized.  3. Return in about 3 weeks (around 11/3/2022).              Mike Little MD  10/13/2022

## 2022-10-28 RX ORDER — METFORMIN HYDROCHLORIDE 500 MG/1
1500 TABLET, EXTENDED RELEASE ORAL NIGHTLY
Qty: 90 TABLET | Refills: 0 | Status: SHIPPED | OUTPATIENT
Start: 2022-10-28 | End: 2022-12-15 | Stop reason: SDUPTHER

## 2022-11-10 ENCOUNTER — OFFICE VISIT (OUTPATIENT)
Dept: OBSTETRICS AND GYNECOLOGY | Facility: CLINIC | Age: 27
End: 2022-11-10

## 2022-11-10 VITALS
WEIGHT: 151 LBS | SYSTOLIC BLOOD PRESSURE: 108 MMHG | HEIGHT: 68 IN | BODY MASS INDEX: 22.88 KG/M2 | DIASTOLIC BLOOD PRESSURE: 80 MMHG

## 2022-11-10 DIAGNOSIS — N96 HISTORY OF MULTIPLE MISCARRIAGES: ICD-10-CM

## 2022-11-10 DIAGNOSIS — F33.40 RECURRENT MAJOR DEPRESSIVE DISORDER, IN REMISSION: ICD-10-CM

## 2022-11-10 DIAGNOSIS — E28.2 PCOS (POLYCYSTIC OVARIAN SYNDROME): ICD-10-CM

## 2022-11-10 DIAGNOSIS — Z32.01 PREGNANCY TEST PERFORMED, PREGNANCY CONFIRMED: ICD-10-CM

## 2022-11-10 DIAGNOSIS — E88.81 INSULIN RESISTANCE: ICD-10-CM

## 2022-11-10 DIAGNOSIS — N97.0 INFERTILITY, ANOVULATION: ICD-10-CM

## 2022-11-10 DIAGNOSIS — N92.6 MISSED PERIOD: Primary | ICD-10-CM

## 2022-11-10 LAB
B-HCG UR QL: POSITIVE
EXPIRATION DATE: ABNORMAL
INTERNAL NEGATIVE CONTROL: NEGATIVE
INTERNAL POSITIVE CONTROL: POSITIVE
Lab: ABNORMAL

## 2022-11-10 PROCEDURE — 81025 URINE PREGNANCY TEST: CPT | Performed by: OBSTETRICS & GYNECOLOGY

## 2022-11-10 PROCEDURE — 99213 OFFICE O/P EST LOW 20 MIN: CPT | Performed by: OBSTETRICS & GYNECOLOGY

## 2022-11-10 RX ORDER — AMOXICILLIN AND CLAVULANATE POTASSIUM 875; 125 MG/1; MG/1
1 TABLET, FILM COATED ORAL 2 TIMES DAILY
Qty: 20 TABLET | Refills: 0 | Status: SHIPPED | OUTPATIENT
Start: 2022-11-10 | End: 2022-11-24

## 2022-11-10 RX ORDER — FOLIC ACID 1 MG/1
2 TABLET ORAL DAILY
Qty: 60 TABLET | Refills: 9 | Status: SHIPPED | OUTPATIENT
Start: 2022-11-10 | End: 2023-02-28

## 2022-11-10 RX ORDER — OXCARBAZEPINE 300 MG/1
300 TABLET, FILM COATED ORAL 2 TIMES DAILY
COMMUNITY
End: 2022-12-05

## 2022-11-10 RX ORDER — ASCORBIC ACID, CHOLECALCIFEROL, .ALPHA.-TOCOPHEROL ACETATE, DL-, PYRIDOXINE, FOLIC ACID, CYANOCOBALAMIN, CALCIUM, FERROUS FUMARATE, MAGNESIUM, DOCONEXENT 85; 200; 10; 25; 1; 12; 140; 27; 45; 300 [IU]/1; [IU]/1; [IU]/1; [IU]/1; MG/1; UG/1; MG/1; MG/1; MG/1; MG/1
1 CAPSULE, GELATIN COATED ORAL DAILY
Qty: 30 CAPSULE | Refills: 11 | Status: SHIPPED | OUTPATIENT
Start: 2022-11-10 | End: 2023-11-10

## 2022-11-10 NOTE — PROGRESS NOTES
"    Chief Complaint   Patient presents with   • post op followup   • infertility followup   • pregnancy          HPI  Eileen Corado is a 26 y.o. female, , who presents with pregnancy.    Recent Tests:  She had a urine pregnancy test that was done today day ago that was positive..    US today: No.  She has not had prenatal care.  She denies associated abdominal or pelvic pain.. Her past medical history is notable for misscarriage.  She does not have passage of tissue.  Rh Status: Positive  She reports no additional symptoms or complaints.    The additional following portions of the patient's history were reviewed and updated as appropriate: allergies, current medications, past family history, past medical history, past social history, past surgical history and problem list.    Review of Systems   Constitutional: Negative for chills and fever.   Respiratory: Negative.    Cardiovascular: Negative.    Gastrointestinal: Negative for abdominal distention, abdominal pain, nausea and vomiting.   Genitourinary: Negative for breast discharge, breast lump, breast pain, dysuria, hematuria, pelvic pain, vaginal bleeding and vaginal discharge.   Psychiatric/Behavioral: Negative for dysphoric mood and depressed mood.     All other systems reviewed and are negative.     I have reviewed and agree with the HPI, ROS, and historical information as entered above. Mike Little MD    Objective   /80   Ht 172.7 cm (68\")   Wt 68.5 kg (151 lb)   LMP 10/09/2022   BMI 22.96 kg/m²     Physical Exam  Constitutional:       Appearance: Normal appearance. She is normal weight.   HENT:      Head: Normocephalic and atraumatic.   Pulmonary:      Effort: Pulmonary effort is normal.   Abdominal:      General: Abdomen is flat.      Palpations: Abdomen is soft.   Neurological:      Mental Status: She is alert and oriented to person, place, and time.   Psychiatric:         Behavior: Behavior normal.            Assessment " and Plan    Problem List Items Addressed This Visit     History of multiple miscarriages    Overview     MAB x 2; 2017 and 2020.   MTHFR was negative.         Insulin resistance    Overview     Fasting glucose was normal. Fasting insulin was 33.  3/2021 begun Metformin ER and increase to 1500 mg nightly         PCOS (polycystic ovarian syndrome)    Overview     Multiple small ovarian follicles.  Insulin resistance.           Infertility, anovulation    Overview     12/16/21; Restart Clomiphene 50 mg days 3-7.     Rx Provera 10 mg for 10 days.  They can start clomiphene on cycle day 3.    Had light menses post provera. Restarted Clomiphene 50 mg from 1/13/21 to 1/18/21. Will need to increase to 100 mg if does not ovulate.     3/3/2022.Did not ovulate on Clomid 100 mg. Can try Femara 5 mg days 3-7.     10/13/2022.  Patient states she is has sporadic ovulation usually day 14 through 16.  Wishes to restart Femara 5 mg days 3 through 7.  She is to chart ovulation tests on body basal by temperature chart.         Depression    Overview     On Oxcarbazepine 300 mg bid.     Patient will consult with psychiatrist regarding taking medication during pregnancy.  Additional folic acid supplementation recommended in first trimester.         Relevant Medications    ALPRAZolam (XANAX) 0.5 MG tablet    amphetamine-dextroamphetamine (ADDERALL) 7.5 MG tablet    Adderall XR 20 MG 24 hr capsule    amitriptyline (ELAVIL) 25 MG tablet    Pregnancy test performed, pregnancy confirmed    Overview     Patient states last menstrual period was on 9/20/2022.  Positive ovulation on 10/23/2022 which was cycle day 15.  Patient conceived on Femara 5 mg days 4 through 8.  Patient had a positive pregnancy test on 11/9/2022.  Confirmed with positive pregnancy test in the office today.    MARKEL 7/15/2023 by day of ovulation.     Check hCG and progesterone level.  Repeat hCG on Monday.  REturn for u/s and NOB        Other Visit Diagnoses     Missed period     -  Primary    Relevant Orders    HCG, B-subunit, Quantitative    Progesterone    HCG, B-subunit, Quantitative          1. Positive home Pregnancy test; UPT confirmed in office.   2. Check HCG and repeat on MOnday. Check progesterone level.    3. Decrease Metformin to 500 mg qhs.  4. Blood type A positive.  5. Contact Psych regarding Carbamazepine in pregnancy. Rx folic acid in first trimester.   6. D/c Adderall.    7. Still having drainage and tenderness from umbilical incision ; Rx Augmentin   Return in about 3 weeks (around 12/1/2022) for Follow-up ultrasound, Next scheduled follow up.    Counseling was given to patient for the following topics: instructions for management .     Mike Little MD  11/10/2022

## 2022-11-11 LAB
HCG INTACT+B SERPL-ACNC: 1202 MIU/ML
PROGEST SERPL-MCNC: 15.4 NG/ML

## 2022-11-14 ENCOUNTER — LAB (OUTPATIENT)
Dept: OBSTETRICS AND GYNECOLOGY | Facility: CLINIC | Age: 27
End: 2022-11-14

## 2022-11-14 DIAGNOSIS — N92.6 MISSED PERIOD: Primary | ICD-10-CM

## 2022-11-14 LAB — HCG INTACT+B SERPL-ACNC: NORMAL MIU/ML

## 2022-11-16 DIAGNOSIS — N92.6 MISSED PERIOD: Primary | ICD-10-CM

## 2022-11-16 DIAGNOSIS — Z87.59 HISTORY OF MISCARRIAGE: ICD-10-CM

## 2022-11-17 ENCOUNTER — LAB (OUTPATIENT)
Dept: OBSTETRICS AND GYNECOLOGY | Facility: CLINIC | Age: 27
End: 2022-11-17

## 2022-11-18 ENCOUNTER — TELEPHONE (OUTPATIENT)
Dept: OBSTETRICS AND GYNECOLOGY | Facility: CLINIC | Age: 27
End: 2022-11-18

## 2022-11-18 DIAGNOSIS — Z87.59 HISTORY OF MISCARRIAGE: Primary | ICD-10-CM

## 2022-11-18 DIAGNOSIS — Z3A.01 LESS THAN 8 WEEKS GESTATION OF PREGNANCY: ICD-10-CM

## 2022-11-18 LAB
HCG INTACT+B SERPL-ACNC: NORMAL MIU/ML
PROGEST SERPL-MCNC: 10.7 NG/ML

## 2022-11-18 RX ORDER — PROGESTERONE 100 MG/1
CAPSULE ORAL
Qty: 30 CAPSULE | Refills: 0 | Status: SHIPPED | OUTPATIENT
Start: 2022-11-18 | End: 2022-12-19 | Stop reason: SDUPTHER

## 2022-11-18 NOTE — TELEPHONE ENCOUNTER
Pt called concerning hcg levels and progesterone levels she had checked yesterday. Her LMP 10/9/22. She has hx of MAB x2. Discussed that they have not been reviewed by a physician at this time and will reach out for her. She verbalizes understanding.     Discussed with JOSE Briones, she orders 100 mg progesterone vaginally qpm, if pt would like to take. Reassured that not experiencing sx of concern at this time, she is okay to maintain NOB appt.     Notified pt, she states she would like to start progesterone at this time. She denies vaginal bleeding or pelvic pain/cramping. Discussed warning signs, and told pt if she has severe pelvic pain or vaginal bleeding to report to ED for evaluation. Verified pharmacy [facility] [place]. She verbalizes understanding.

## 2022-12-01 ENCOUNTER — TELEPHONE (OUTPATIENT)
Dept: OBSTETRICS AND GYNECOLOGY | Facility: CLINIC | Age: 27
End: 2022-12-01

## 2022-12-01 NOTE — TELEPHONE ENCOUNTER
Patient reports that she is approx 8 weeks pregnant, she is a pt of Dr. Velasco's, she has her New OB appt scheduled for next Monday 12/5/22. She reports that she has a hx of migraines. She reports she has had a migraine for 3 days. She is wanting to know if she can take anything else besides Tylenol for her migraine. She also reports nausea. Instructed patient she could try headache cocktail for headache relief-1 can of soda,1 bottle water, tylenol, benadryl, warm bath and rest. Instructed patient to try unisom and vit b 6 for the nausea. Pt VU. Instructed to call back if no relief.        -GEO Farrar

## 2022-12-05 ENCOUNTER — INITIAL PRENATAL (OUTPATIENT)
Dept: OBSTETRICS AND GYNECOLOGY | Facility: CLINIC | Age: 27
End: 2022-12-05

## 2022-12-05 VITALS — BODY MASS INDEX: 22.35 KG/M2 | SYSTOLIC BLOOD PRESSURE: 130 MMHG | WEIGHT: 147 LBS | DIASTOLIC BLOOD PRESSURE: 80 MMHG

## 2022-12-05 DIAGNOSIS — Z34.91 INITIAL OBSTETRIC VISIT IN FIRST TRIMESTER: Primary | ICD-10-CM

## 2022-12-05 DIAGNOSIS — E28.2 PCOS (POLYCYSTIC OVARIAN SYNDROME): ICD-10-CM

## 2022-12-05 PROCEDURE — 0501F PRENATAL FLOW SHEET: CPT | Performed by: OBSTETRICS & GYNECOLOGY

## 2022-12-05 RX ORDER — PROMETHAZINE HYDROCHLORIDE 12.5 MG/1
12.5 TABLET ORAL EVERY 6 HOURS PRN
Qty: 30 TABLET | Refills: 1 | Status: SHIPPED | OUTPATIENT
Start: 2022-12-05

## 2022-12-05 NOTE — PATIENT INSTRUCTIONS
Prenatal Care  Prenatal care is health care during pregnancy. It helps you and your unborn baby (fetus) stay as healthy as possible. Prenatal care may be provided by a midwife, a family practice doctor, a mid-level practitioner (nurse practitioner or physician assistant), or a childbirth and pregnancy doctor (obstetrician).  How does this affect me?  During pregnancy, you will be closely monitored for any new conditions that might develop. To lower your risk of pregnancy complications, you and your health care provider will talk about any underlying conditions you have.  How does this affect my baby?  Early and consistent prenatal care increases the chance that your baby will be healthy during pregnancy. Prenatal care lowers the risk that your baby will be:  Born early (prematurely).  Smaller than expected at birth (small for gestational age).  What can I expect at the first prenatal care visit?  Your first prenatal care visit will likely be the longest. You should schedule your first prenatal care visit as soon as you know that you are pregnant. Your first visit is a good time to talk about any questions or concerns you have about pregnancy.  Medical history  At your visit, you and your health care provider will talk about your medical history, including:  Any past pregnancies.  Your family's medical history.  Medical history of the baby's father.  Any long-term (chronic) health conditions you have and how you manage them.  Any surgeries or procedures you have had.  Any current over-the-counter or prescription medicines, herbs, or supplements that you are taking.  Other factors that could pose a risk to your baby, including:  Exposure to harmful chemicals or radiation at work or at home.  Any substance use, including tobacco, alcohol, and drug use.  Your home setting and your stress levels, including:  Exposure to abuse or violence.  Household financial strain.  Your daily health habits, including diet and  exercise.  Tests and screenings  Your health care provider will:  Measure your weight, height, and blood pressure.  Do a physical exam, including a pelvic and breast exam.  Perform blood tests and urine tests to check for:  Urinary tract infection.  Sexually transmitted infections (STIs).  Low iron levels in your blood (anemia).  Blood type and certain proteins on red blood cells (Rh antibodies).  Infections and immunity to viruses, such as hepatitis B and rubella.  HIV (human immunodeficiency virus).  Discuss your options for genetic screening.  Tips about staying healthy  Your health care provider will also give you information about how to keep yourself and your baby healthy, including:  Nutrition and taking vitamins.  Physical activity.  How to manage pregnancy symptoms such as nausea and vomiting (morning sickness).  Infections and substances that may be harmful to your baby and how to avoid them.  Food safety.  Dental care.  Working.  Travel.  Warning signs to watch for and when to call your health care provider.  How often will I have prenatal care visits?  After your first prenatal care visit, you will have regular visits throughout your pregnancy. The visit schedule is often as follows:  Up to week 28 of pregnancy: once every 4 weeks.  28-36 weeks: once every 2 weeks.  After 36 weeks: every week until delivery.  Some women may have visits more or less often depending on any underlying health conditions and the health of the baby.  Keep all follow-up and prenatal care visits. This is important.  What happens during routine prenatal care visits?  Your health care provider will:  Measure your weight and blood pressure.  Check for fetal heart sounds.  Measure the height of your uterus in your abdomen (fundal height). This may be measured starting around week 20 of pregnancy.  Check the position of your baby inside your uterus.  Ask questions about your diet, sleeping patterns, and whether you can feel the baby  move.  Review warning signs to watch for and signs of labor.  Ask about any pregnancy symptoms you are having and how you are dealing with them. Symptoms may include:  Headaches.  Nausea and vomiting.  Vaginal discharge.  Swelling.  Fatigue.  Constipation.  Changes in your vision.  Feeling persistently sad or anxious.  Any discomfort, including back or pelvic pain.  Bleeding or spotting.  Make a list of questions to ask your health care provider at your routine visits.  What tests might I have during prenatal care visits?  You may have blood, urine, and imaging tests throughout your pregnancy, such as:  Urine tests to check for glucose, protein, or signs of infection.  Glucose tests to check for a form of diabetes that can develop during pregnancy (gestational diabetes mellitus). This is usually done around week 24 of pregnancy.  Ultrasounds to check your baby's growth and development, to check for birth defects, and to check your baby's well-being. These can also help to decide when you should deliver your baby.  A test to check for group B strep (GBS) infection. This is usually done around week 36 of pregnancy.  Genetic testing. This may include blood, fluid, or tissue sampling, or imaging tests, such as an ultrasound. Some genetic tests are done during the first trimester and some are done during the second trimester.  What else can I expect during prenatal care visits?  Your health care provider may recommend getting certain vaccines during pregnancy. These may include:  A yearly flu shot (annual influenza vaccine). This is especially important if you will be pregnant during flu season.  Tdap (tetanus, diphtheria, pertussis) vaccine. Getting this vaccine during pregnancy can protect your baby from whooping cough (pertussis) after birth. This vaccine may be recommended between weeks 27 and 36 of pregnancy.  A COVID-19 vaccine.  Later in your pregnancy, your health care provider may give you information  about:  Childbirth and breastfeeding classes.  Choosing a health care provider for your baby.  Umbilical cord banking.  Breastfeeding.  Birth control after your baby is born.  The hospital labor and delivery unit and how to set up a tour.  Registering at the hospital before you go into labor.  Where to find more information  Office on Women's Health: womenshealth.gov  American Pregnancy Association: americanpregnancy.org  March of Dimes: marchofdimes.org  Summary  Prenatal care helps you and your baby stay as healthy as possible during pregnancy.  Your first prenatal care visit will most likely be the longest.  You will have visits and tests throughout your pregnancy to monitor your health and your baby's health.  Bring a list of questions to your visits to ask your health care provider.  Make sure to keep all follow-up and prenatal care visits.  This information is not intended to replace advice given to you by your health care provider. Make sure you discuss any questions you have with your health care provider.  Document Revised: 09/30/2021 Document Reviewed: 09/30/2021  [employer] Patient Education © 2022 [employer]

## 2022-12-05 NOTE — PROGRESS NOTES
Initial ob visit     CC- Here for care of pregnancy        Eileen Corado is a 26 y.o. female, , who presents for her first obstetrical visit.  Her last LMP was Patient's last menstrual period was 10/09/2022.. Her MARKEL 2022  Current GA 8weeks.     Initial positive test date : 2022, UPT        Her periods are: 28 days  Prior obstetric issues: none  Patient's past medical history is significant for: denies.  Family history of genetic issues (includes FOB): denies  Prior infections concerning in pregnancy (Rash, fever in last 2 weeks): Yes, random places on body  Varicella Hx - history of chicken pox  Prior testing for Cystic Fibrosis Carrier or Sickle Cell Trait- denies  Prepregnancy BMI - Body mass index is 22.35 kg/m².  History of STD: no  Hx of HSV for patient or partner: no  Ultrasound Today: yes    OB History    Para Term  AB Living   3       2     SAB IAB Ectopic Molar Multiple Live Births   2                # Outcome Date GA Lbr Myke/2nd Weight Sex Delivery Anes PTL Lv   3 Current            2 2021           1 2017               Additional Pertinent History   Last Pap : 2021 Result: negative HPV: negative     Last Completed Pap Smear          PAP SMEAR (Every 3 Years) Next due on 2021  SCANNED - PAP SMEAR              History of abnormal Pap smear: no  Family history of uterine, colon, breast, or ovarian cancer: yes - Ma uterine and breast  Feelings of Anxiety or Depression: yes - self controlled  Tobacco Usage?: No   Alcohol/Drug Use?: NO  Over the age of 35 at delivery: no  Desires Genetic Screening: None  Flu Status: Declines    PMH    Current Outpatient Medications:   •  folic acid (FOLVITE) 1 MG tablet, Take 2 tablets by mouth Daily., Disp: 60 tablet, Rfl: 9  •  metFORMIN ER (Glucophage XR) 500 MG 24 hr tablet, Take 3 tablets by mouth Every Night for 180 days., Disp: 90 tablet, Rfl: 0  •  Prenat w/o N-KK-Vsflrob-FA-DHA  (PNV-DHA) 27-0.6-0.4-300 MG capsule, Take 1 tablet by mouth Daily., Disp: 30 capsule, Rfl: 11  •  Progesterone (PROMETRIUM) 100 MG capsule, Insert one capsule into vagina every night., Disp: 30 capsule, Rfl: 0  •  Adderall XR 20 MG 24 hr capsule, Take 20 mg by mouth Every Morning, Disp: , Rfl:   •  ALPRAZolam (XANAX) 0.5 MG tablet, Take 0.5 mg by mouth Daily As Needed., Disp: , Rfl:   •  amitriptyline (ELAVIL) 25 MG tablet, Patient reports she will start taking this when she finishes her seroquel, Disp: , Rfl:   •  amphetamine-dextroamphetamine (ADDERALL) 7.5 MG tablet, Take 7.5 mg by mouth Daily., Disp: , Rfl:   •  naproxen sodium (Anaprox DS) 550 MG tablet, Take 1 tablet by mouth 2 (Two) Times a Day With Meals. PRN, Disp: 40 tablet, Rfl: 2  •  OXcarbazepine (TRILEPTAL) 300 MG tablet, Take 1 tablet by mouth 2 (Two) Times a Day., Disp: , Rfl:   •  promethazine (PHENERGAN) 12.5 MG tablet, Take 1 tablet by mouth Every 6 (Six) Hours As Needed for Nausea or Vomiting., Disp: 30 tablet, Rfl: 1     Past Medical History:   Diagnosis Date   • Anxiety    • Depression    • Endometriosis    • Female infertility    • Hyperlipemia    • Hypertension    • Ovarian cyst    • Polycystic ovary syndrome    • PONV (postoperative nausea and vomiting)    • Recurrent pregnancy loss, antepartum condition or complication         Past Surgical History:   Procedure Laterality Date   • DIAGNOSTIC LAPAROSCOPY  09/20/2022    diagnostic lap with excision of left paratubal cyst, chromotubation   • FAT GRAFTING      left heel   • KNEE MENISCAL REPAIR      right and left   • TONSILLECTOMY AND ADENOIDECTOMY     • WISDOM TOOTH EXTRACTION         Review of Systems   Review of Systems  Patient Reports: Nausea and Nausea and vomiting  Patient Denies: Spotting, Heavy bleeding, Cramping and Fatigue  All systems reviewed and otherwise normal.    I have reviewed and agree with the HPI, ROS, and historical information as entered above. Louis Velasco,  MD    /80   Wt 66.7 kg (147 lb)   LMP 10/09/2022   BMI 22.35 kg/m²     The additional following portions of the patient's history were reviewed and updated as appropriate: allergies, current medications, past family history, past medical history, past social history, past surgical history and problem list.    Physical Exam  General:  well developed; well nourished  no acute distress   Chest/Respiratory: No labored breathing, normal respiratory effort, normal appearance, no respiratory noises noted   Heart:  normal rate, regular rhythm,  no murmurs, rubs, or gallops   Thyroid: normal to inspection and palpation   Breasts:  Not performed.   Abdomen: soft, non-tender; no masses  no umbilical or inguinal hernias are present  no hepato-splenomegaly   Pelvis: Not performed.        Assessment and Plan    Problem List Items Addressed This Visit        Endocrine and Metabolic    PCOS (polycystic ovarian syndrome)    Overview     Multiple small ovarian follicles.  Insulin resistance.          Other Visit Diagnoses     Initial obstetric visit in first trimester    -  Primary    Relevant Orders    Obstetric Panel    Chlamydia trachomatis, Neisseria gonorrhoeae, PCR w/ confirmation - Urine, Urine, Clean Catch    HIV-1 / O / 2 Ag / Antibody 4th Generation    Urinalysis With Microscopic - Urine, Clean Catch    Urine Culture - Urine, Urine, Clean Catch    Urine Drug Screen - Urine, Clean Catch          1. Pregnancy at Unknown  2. Reviewed routine prenatal care with the office and educational materials given  3. Lab(s) Ordered  4. Discussed options for genetic testing including first trimester nuchal translucency screen, genetic disease carrier testing, quadruple screen, and [place].  5. Medication(s) Ordered  6. Nausea/Vomiting - desires medication.  Options discussed and encouraged to be proactive to avoid constipation if on Zofran.  7. Patient is on Prenatal vitamins  Return in about 4 weeks (around 1/2/2023) for Routine  prenatal care.      Louis Velasco MD  12/05/2022

## 2022-12-06 LAB
ABO GROUP BLD: ABNORMAL
BASOPHILS # BLD AUTO: 0 X10E3/UL (ref 0–0.2)
BASOPHILS NFR BLD AUTO: 0 %
BLD GP AB SCN SERPL QL: NEGATIVE
EOSINOPHIL # BLD AUTO: 0 X10E3/UL (ref 0–0.4)
EOSINOPHIL NFR BLD AUTO: 0 %
ERYTHROCYTE [DISTWIDTH] IN BLOOD BY AUTOMATED COUNT: 13.4 % (ref 11.7–15.4)
HBV SURFACE AG SERPL QL IA: NEGATIVE
HCT VFR BLD AUTO: 41.7 % (ref 34–46.6)
HCV AB S/CO SERPL IA: 0.1 S/CO RATIO (ref 0–0.9)
HGB BLD-MCNC: 13.6 G/DL (ref 11.1–15.9)
HIV 1+2 AB+HIV1 P24 AG SERPL QL IA: NON REACTIVE
IMM GRANULOCYTES # BLD AUTO: 0 X10E3/UL (ref 0–0.1)
IMM GRANULOCYTES NFR BLD AUTO: 0 %
LYMPHOCYTES # BLD AUTO: 2.2 X10E3/UL (ref 0.7–3.1)
LYMPHOCYTES NFR BLD AUTO: 22 %
MCH RBC QN AUTO: 28.9 PG (ref 26.6–33)
MCHC RBC AUTO-ENTMCNC: 32.6 G/DL (ref 31.5–35.7)
MCV RBC AUTO: 89 FL (ref 79–97)
MONOCYTES # BLD AUTO: 0.4 X10E3/UL (ref 0.1–0.9)
MONOCYTES NFR BLD AUTO: 4 %
NEUTROPHILS # BLD AUTO: 7.1 X10E3/UL (ref 1.4–7)
NEUTROPHILS NFR BLD AUTO: 74 %
PLATELET # BLD AUTO: 279 X10E3/UL (ref 150–450)
RBC # BLD AUTO: 4.7 X10E6/UL (ref 3.77–5.28)
RH BLD: POSITIVE
RPR SER QL: NON REACTIVE
RUBV IGG SERPL IA-ACNC: 1.94 INDEX
WBC # BLD AUTO: 9.8 X10E3/UL (ref 3.4–10.8)

## 2022-12-08 LAB
AMPHETAMINES UR QL SCN: NEGATIVE NG/ML
APPEARANCE UR: CLEAR
BACTERIA #/AREA URNS HPF: NORMAL /[HPF]
BACTERIA UR CULT: NORMAL
BACTERIA UR CULT: NORMAL
BARBITURATES UR QL SCN: NEGATIVE NG/ML
BENZODIAZ UR QL SCN: NEGATIVE NG/ML
BILIRUB UR QL STRIP: NEGATIVE
BZE UR QL SCN: NEGATIVE NG/ML
C TRACH RRNA SPEC QL NAA+PROBE: NEGATIVE
CANNABINOIDS UR QL SCN: NEGATIVE NG/ML
CASTS URNS QL MICRO: NORMAL /LPF
COLOR UR: YELLOW
CREAT UR-MCNC: 260 MG/DL (ref 20–300)
EPI CELLS #/AREA URNS HPF: NORMAL /HPF (ref 0–10)
GLUCOSE UR QL STRIP: NEGATIVE
HGB UR QL STRIP: NEGATIVE
KETONES UR QL STRIP: NEGATIVE
LABORATORY COMMENT REPORT: NORMAL
LEUKOCYTE ESTERASE UR QL STRIP: NEGATIVE
METHADONE UR QL SCN: NEGATIVE NG/ML
MICRO URNS: NORMAL
MICRO URNS: NORMAL
N GONORRHOEA RRNA SPEC QL NAA+PROBE: NEGATIVE
NITRITE UR QL STRIP: NEGATIVE
OPIATES UR QL SCN: NEGATIVE NG/ML
OXYCODONE+OXYMORPHONE UR QL SCN: NEGATIVE NG/ML
PCP UR QL: NEGATIVE NG/ML
PH UR STRIP: 6 [PH] (ref 5–7.5)
PH UR: 6.2 [PH] (ref 4.5–8.9)
PROPOXYPH UR QL SCN: NEGATIVE NG/ML
PROT UR QL STRIP: NORMAL
RBC #/AREA URNS HPF: NORMAL /HPF (ref 0–2)
SP GR UR STRIP: 1.03 (ref 1–1.03)
UROBILINOGEN UR STRIP-MCNC: 0.2 MG/DL (ref 0.2–1)
WBC #/AREA URNS HPF: NORMAL /HPF (ref 0–5)

## 2022-12-12 ENCOUNTER — LAB (OUTPATIENT)
Dept: OBSTETRICS AND GYNECOLOGY | Facility: CLINIC | Age: 27
End: 2022-12-12

## 2022-12-12 DIAGNOSIS — Z34.01 ENCOUNTER FOR SUPERVISION OF NORMAL FIRST PREGNANCY IN FIRST TRIMESTER: Primary | ICD-10-CM

## 2022-12-16 DIAGNOSIS — Z87.59 HISTORY OF MISCARRIAGE: ICD-10-CM

## 2022-12-16 DIAGNOSIS — Z3A.01 LESS THAN 8 WEEKS GESTATION OF PREGNANCY: ICD-10-CM

## 2022-12-16 PROBLEM — Z34.90 PREGNANCY: Status: ACTIVE | Noted: 2022-12-16

## 2022-12-16 LAB
CFDNA.FET/CFDNA.TOTAL SFR FETUS: NORMAL %
CITATION REF LAB TEST: NORMAL
FET 13+18+21+X+Y ANEUP PLAS.CFDNA: NEGATIVE
FET CHR 21 TS PLAS.CFDNA QL: NEGATIVE
FET SEX PLAS.CFDNA DOSAGE CFDNA: NORMAL
FET TS 13 RISK PLAS.CFDNA QL: NEGATIVE
FET TS 18 RISK WBC.DNA+CFDNA QL: NEGATIVE
GA EST FROM CONCEPTION DATE: NORMAL D
GESTATIONAL AGE > 9:: YES
LAB DIRECTOR NAME PROVIDER: NORMAL
LAB DIRECTOR NAME PROVIDER: NORMAL
LABORATORY COMMENT REPORT: NORMAL
LIMITATIONS OF THE TEST: NORMAL
NEGATIVE PREDICTIVE VALUE: NORMAL
NOTE: NORMAL
PERFORMANCE CHARACTERISTICS: NORMAL
POSITIVE PREDICTIVE VALUE: NORMAL
REF LAB TEST METHOD: NORMAL
TEST PERFORMANCE INFO SPEC: NORMAL

## 2022-12-16 RX ORDER — METFORMIN HYDROCHLORIDE 500 MG/1
1500 TABLET, EXTENDED RELEASE ORAL NIGHTLY
Qty: 30 TABLET | Refills: 0 | Status: SHIPPED | OUTPATIENT
Start: 2022-12-16 | End: 2022-12-19

## 2022-12-19 ENCOUNTER — TELEPHONE (OUTPATIENT)
Dept: OBSTETRICS AND GYNECOLOGY | Facility: CLINIC | Age: 27
End: 2022-12-19

## 2022-12-19 DIAGNOSIS — Z87.59 HISTORY OF MISCARRIAGE: ICD-10-CM

## 2022-12-19 DIAGNOSIS — Z3A.01 LESS THAN 8 WEEKS GESTATION OF PREGNANCY: ICD-10-CM

## 2022-12-19 RX ORDER — PROGESTERONE 100 MG/1
CAPSULE ORAL
Qty: 30 CAPSULE | Refills: 0 | Status: SHIPPED | OUTPATIENT
Start: 2022-12-19 | End: 2023-02-28

## 2022-12-19 RX ORDER — METFORMIN HYDROCHLORIDE 500 MG/1
1500 TABLET, EXTENDED RELEASE ORAL NIGHTLY
Qty: 90 TABLET | Refills: 0 | Status: SHIPPED | OUTPATIENT
Start: 2022-12-19 | End: 2023-02-28

## 2022-12-19 RX ORDER — PROGESTERONE 100 MG/1
CAPSULE ORAL
Qty: 30 CAPSULE | Refills: 0 | OUTPATIENT
Start: 2022-12-19

## 2022-12-19 NOTE — TELEPHONE ENCOUNTER
[place] PATIENT    CALLED AND WENT OVER OB CONTRACT WITH PT. WILL SIGN AND MAKE FIRST PAYMENT HAMIDA.

## 2022-12-21 ENCOUNTER — TELEPHONE (OUTPATIENT)
Dept: OBSTETRICS AND GYNECOLOGY | Facility: CLINIC | Age: 27
End: 2022-12-21

## 2022-12-21 NOTE — TELEPHONE ENCOUNTER
"Returned patients phone call. Patient reports that she had one isolated event where she had some \"watery orange/pink spotting\" when she wiped last night. Patient denies any pain. Patient reports she has not had any bleeding today. Patient also reports that she has had some n/v and diarrhea today. Advised patient to drink lots of fluids to stay hydrated. Advised patient to continue to monitor for bleeding and if it becomes heavy to where she is saturating a pad/tampon in 30 minutes to an hour, or has doubling over pain, then she needs to go to ER for evaluation. Patient reports she is taking medication for n/v, advised patient that if she has continuous vomiting for 24 hours and unable to keep fluids down, then she needs to go to ER for fluids. Patient advised she may want to follow up with her PCP for follow up if symptoms persist. Patient V/U.     MBT: A Positive   "

## 2022-12-21 NOTE — TELEPHONE ENCOUNTER
Caller: Eileen Corado    Relationship to patient: Self    Best call back number: [phone]    Patient is needing: PT IS 10 WEEKS PREGNANT, SPOTTINGTHROWING UP AND HAVING DIARRHEA, WANTS TO KNOW WHAT SHE SHOULD DO

## 2023-01-03 ENCOUNTER — ROUTINE PRENATAL (OUTPATIENT)
Dept: OBSTETRICS AND GYNECOLOGY | Facility: CLINIC | Age: 28
End: 2023-01-03
Payer: COMMERCIAL

## 2023-01-03 VITALS — DIASTOLIC BLOOD PRESSURE: 78 MMHG | BODY MASS INDEX: 23.26 KG/M2 | SYSTOLIC BLOOD PRESSURE: 102 MMHG | WEIGHT: 153 LBS

## 2023-01-03 DIAGNOSIS — R42 POSTURAL DIZZINESS WITH PRESYNCOPE: ICD-10-CM

## 2023-01-03 DIAGNOSIS — Z3A.12 12 WEEKS GESTATION OF PREGNANCY: Primary | ICD-10-CM

## 2023-01-03 DIAGNOSIS — R55 POSTURAL DIZZINESS WITH PRESYNCOPE: ICD-10-CM

## 2023-01-03 DIAGNOSIS — O20.0 THREATENED ABORTION: ICD-10-CM

## 2023-01-03 LAB
GLUCOSE UR STRIP-MCNC: NEGATIVE MG/DL
PROT UR STRIP-MCNC: NEGATIVE MG/DL

## 2023-01-03 PROCEDURE — 0502F SUBSEQUENT PRENATAL CARE: CPT | Performed by: OBSTETRICS & GYNECOLOGY

## 2023-01-03 NOTE — PROGRESS NOTES
OB FOLLOW UP  CC- Here for care of pregnancy        Eileen Corado is a 27 y.o.  12w2d patient being seen today for her obstetrical follow up visit. Patient reports patient passed out at [employer] yesterday and has had some spotting with low BP.   Worked in today due to spotting for threatened . Reviewed normal US today and discussed findings.     Her prenatal care is complicated by (and status) : None  Patient Active Problem List   Diagnosis   • History of multiple miscarriages   • Irregular menses   • Weight gain, abnormal   • Insulin resistance   • Pre-conception counseling   • Left Paratubal cyst   • PCOS (polycystic ovarian syndrome)   • Infertility, anovulation   • Abnormal uterine bleeding (AUB)   • Screening for cervical cancer   • Left lower quadrant abdominal pain   • Dysmenorrhea   • Depression   • Pregnancy test performed, pregnancy confirmed   • Pregnancy   • Threatened    • Postural dizziness with presyncope       Desires genetic testing?: Yes with Gender  Flu Status: Declines  Ultrasound Today: Yes    ROS -   Patient Reports : spotting  Patient Denies: Loss of Fluid, Vision Changes, Headaches, Nausea  and Vomiting   Fetal Movement : to early  All other systems reviewed and are negative.     The additional following portions of the patient's history were reviewed and updated as appropriate: allergies and current medications.    I have reviewed and agree with the HPI, ROS, and historical information as entered above. Louis Velasco MD    /78   Wt 69.4 kg (153 lb)   LMP 10/09/2022   BMI 23.26 kg/m²         EXAM:     Prenatal Vitals  BP: 102/78  Weight: 69.4 kg (153 lb)              Urine Glucose Read-only: Negative  Urine Protein Read-only: Negative       Assessment and Plan    Problem List Items Addressed This Visit        Gravid and     Pregnancy - Primary    Overview     Cell free DNA low risk; c/w male.          Relevant Medications     Progesterone (PROMETRIUM) 100 MG capsule    Other Relevant Orders    POC Urinalysis Dipstick (Completed)    Hemoglobin A1c    CBC (No Diff)    Comprehensive Metabolic Panel    Threatened        Symptoms and Signs    Postural dizziness with presyncope       1. Pregnancy at 12w2d  2. Labs reviewed from New OB Visit.  3. Counseled on genetic testing, carrier status and option for NT screen  4. Activity and Exercise discussed.  5. Lab(s) Ordered  6. Patient is on Prenatal vitamins  Return in about 4 weeks (around 2023) for Routine prenatal care.    Louis Velasco MD  2023

## 2023-01-04 LAB
ALBUMIN SERPL-MCNC: 4.3 G/DL (ref 3.9–5)
ALBUMIN/GLOB SERPL: 2 {RATIO} (ref 1.2–2.2)
ALP SERPL-CCNC: 63 IU/L (ref 44–121)
ALT SERPL-CCNC: 15 IU/L (ref 0–32)
AST SERPL-CCNC: 16 IU/L (ref 0–40)
BILIRUB SERPL-MCNC: <0.2 MG/DL (ref 0–1.2)
BUN SERPL-MCNC: 10 MG/DL (ref 6–20)
BUN/CREAT SERPL: 19 (ref 9–23)
CALCIUM SERPL-MCNC: 9 MG/DL (ref 8.7–10.2)
CHLORIDE SERPL-SCNC: 104 MMOL/L (ref 96–106)
CO2 SERPL-SCNC: 21 MMOL/L (ref 20–29)
CREAT SERPL-MCNC: 0.52 MG/DL (ref 0.57–1)
EGFRCR SERPLBLD CKD-EPI 2021: 131 ML/MIN/1.73
ERYTHROCYTE [DISTWIDTH] IN BLOOD BY AUTOMATED COUNT: 13.8 % (ref 11.7–15.4)
GLOBULIN SER CALC-MCNC: 2.2 G/DL (ref 1.5–4.5)
GLUCOSE SERPL-MCNC: 74 MG/DL (ref 70–99)
HBA1C MFR BLD: 5.3 % (ref 4.8–5.6)
HCT VFR BLD AUTO: 37.2 % (ref 34–46.6)
HGB BLD-MCNC: 12.2 G/DL (ref 11.1–15.9)
MCH RBC QN AUTO: 29.2 PG (ref 26.6–33)
MCHC RBC AUTO-ENTMCNC: 32.8 G/DL (ref 31.5–35.7)
MCV RBC AUTO: 89 FL (ref 79–97)
PLATELET # BLD AUTO: 241 X10E3/UL (ref 150–450)
POTASSIUM SERPL-SCNC: 4.1 MMOL/L (ref 3.5–5.2)
PROT SERPL-MCNC: 6.5 G/DL (ref 6–8.5)
RBC # BLD AUTO: 4.18 X10E6/UL (ref 3.77–5.28)
SODIUM SERPL-SCNC: 138 MMOL/L (ref 134–144)
WBC # BLD AUTO: 10.4 X10E3/UL (ref 3.4–10.8)

## 2023-01-14 DIAGNOSIS — Z3A.01 LESS THAN 8 WEEKS GESTATION OF PREGNANCY: ICD-10-CM

## 2023-01-14 DIAGNOSIS — Z87.59 HISTORY OF MISCARRIAGE: ICD-10-CM

## 2023-01-16 RX ORDER — PROGESTERONE 100 MG/1
CAPSULE ORAL
Qty: 30 CAPSULE | Refills: 0 | OUTPATIENT
Start: 2023-01-16

## 2023-01-31 ENCOUNTER — ROUTINE PRENATAL (OUTPATIENT)
Dept: OBSTETRICS AND GYNECOLOGY | Facility: CLINIC | Age: 28
End: 2023-01-31
Payer: COMMERCIAL

## 2023-01-31 VITALS — DIASTOLIC BLOOD PRESSURE: 80 MMHG | WEIGHT: 152 LBS | SYSTOLIC BLOOD PRESSURE: 118 MMHG | BODY MASS INDEX: 23.11 KG/M2

## 2023-01-31 DIAGNOSIS — Z3A.16 16 WEEKS GESTATION OF PREGNANCY: ICD-10-CM

## 2023-01-31 DIAGNOSIS — Z34.82 SUPERVISION OF NORMAL INTRAUTERINE PREGNANCY IN MULTIGRAVIDA IN SECOND TRIMESTER: Primary | ICD-10-CM

## 2023-01-31 LAB
GLUCOSE UR STRIP-MCNC: NEGATIVE MG/DL
PROT UR STRIP-MCNC: NEGATIVE MG/DL

## 2023-01-31 PROCEDURE — 0502F SUBSEQUENT PRENATAL CARE: CPT | Performed by: OBSTETRICS & GYNECOLOGY

## 2023-01-31 NOTE — PROGRESS NOTES
OB FOLLOW UP  CC- Here for care of pregnancy        Eileen Corado is a 27 y.o.  16w2d patient being seen today for her obstetrical follow up visit. Patient reports constipation    Her prenatal care is complicated by (and status) : None  Patient Active Problem List   Diagnosis   • History of multiple miscarriages   • Irregular menses   • Weight gain, abnormal   • Insulin resistance   • Pre-conception counseling   • Left Paratubal cyst   • PCOS (polycystic ovarian syndrome)   • Infertility, anovulation   • Abnormal uterine bleeding (AUB)   • Screening for cervical cancer   • Left lower quadrant abdominal pain   • Dysmenorrhea   • Depression   • Pregnancy test performed, pregnancy confirmed   • Pregnancy   • Threatened    • Postural dizziness with presyncope       Flu Status: Declines  Ultrasound Today: No    AFP: declines    ROS -   Patient Reports : constipation  Patient Denies: Loss of Fluid, Vaginal Spotting, Vision Changes, Headaches, Nausea , Vomiting  and Contractions  Fetal Movement : absent  All other systems reviewed and are negative.       The additional following portions of the patient's history were reviewed and updated as appropriate: allergies and current medications.    I have reviewed and agree with the HPI, ROS, and historical information as entered above. Louis Velasco MD        EXAM:     Prenatal Vitals  BP: 118/80  Weight: 68.9 kg (152 lb)       Pelvic Exam:        Urine Glucose Read-only: Negative  Urine Protein Read-only: Negative           Assessment and Plan    Problem List Items Addressed This Visit        Gravid and     Pregnancy    Overview     Cell free DNA low risk; c/w male.          Relevant Medications    Progesterone (PROMETRIUM) 100 MG capsule    Other Relevant Orders    POC Urinalysis Dipstick (Completed)    US Ob 14 + Weeks Single or First Gestation   Other Visit Diagnoses     Supervision of normal intrauterine pregnancy in  multigravida in second trimester    -  Primary    Relevant Orders    Chlamydia trachomatis, Neisseria gonorrhoeae, PCR w/ confirmation - Urine, Urine, Clean Catch    Urine Drug Screen - Urine, Clean Catch          1. Pregnancy at 16w2d  2. Fetal status reassuring.   3. Counseled on MSAFP alone in relation to OTD and placental issues.    4. Anatomy scan next visit.   5. Activity and Exercise discussed.  6. U/S ordered at follow up  7. Patient is on Prenatal vitamins  Return in about 4 weeks (around 2/28/2023) for Routine prenatal care and anatomy scan.    Louis Velasco MD  01/31/2023

## 2023-02-03 ENCOUNTER — TELEPHONE (OUTPATIENT)
Dept: OBSTETRICS AND GYNECOLOGY | Facility: CLINIC | Age: 28
End: 2023-02-03

## 2023-02-03 LAB
AMPHETAMINES UR QL SCN: NEGATIVE NG/ML
BARBITURATES UR QL SCN: NEGATIVE NG/ML
BENZODIAZ UR QL SCN: NEGATIVE NG/ML
BZE UR QL SCN: NEGATIVE NG/ML
C TRACH RRNA SPEC QL NAA+PROBE: NEGATIVE
CANNABINOIDS UR QL SCN: NEGATIVE NG/ML
CREAT UR-MCNC: 154.8 MG/DL (ref 20–300)
LABORATORY COMMENT REPORT: NORMAL
METHADONE UR QL SCN: NEGATIVE NG/ML
N GONORRHOEA RRNA SPEC QL NAA+PROBE: NEGATIVE
OPIATES UR QL SCN: NEGATIVE NG/ML
OXYCODONE+OXYMORPHONE UR QL SCN: NEGATIVE NG/ML
PCP UR QL: NEGATIVE NG/ML
PH UR: 6.1 [PH] (ref 4.5–8.9)
PROPOXYPH UR QL SCN: NEGATIVE NG/ML

## 2023-02-03 NOTE — TELEPHONE ENCOUNTER
Caller: Eileen Corado    Relationship to patient: Self    Best call back number: [phone]    Patient is needing: PT HAS QUESTION ON OB CONTRACT

## 2023-02-08 ENCOUNTER — DOCUMENTATION (OUTPATIENT)
Dept: OBSTETRICS AND GYNECOLOGY | Facility: CLINIC | Age: 28
End: 2023-02-08
Payer: COMMERCIAL

## 2023-02-08 NOTE — PROGRESS NOTES
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

## 2023-02-28 ENCOUNTER — ROUTINE PRENATAL (OUTPATIENT)
Dept: OBSTETRICS AND GYNECOLOGY | Facility: CLINIC | Age: 28
End: 2023-02-28
Payer: COMMERCIAL

## 2023-02-28 VITALS — WEIGHT: 161 LBS | BODY MASS INDEX: 24.48 KG/M2 | DIASTOLIC BLOOD PRESSURE: 88 MMHG | SYSTOLIC BLOOD PRESSURE: 126 MMHG

## 2023-02-28 DIAGNOSIS — H10.022 PINK EYE DISEASE OF LEFT EYE: ICD-10-CM

## 2023-02-28 DIAGNOSIS — Z3A.20 20 WEEKS GESTATION OF PREGNANCY: Primary | ICD-10-CM

## 2023-02-28 LAB
GLUCOSE UR STRIP-MCNC: NEGATIVE MG/DL
PROT UR STRIP-MCNC: NEGATIVE MG/DL

## 2023-02-28 PROCEDURE — 0502F SUBSEQUENT PRENATAL CARE: CPT | Performed by: OBSTETRICS & GYNECOLOGY

## 2023-02-28 RX ORDER — ERYTHROMYCIN 5 MG/G
OINTMENT OPHTHALMIC NIGHTLY
Qty: 1 G | Refills: 1 | Status: SHIPPED | OUTPATIENT
Start: 2023-02-28 | End: 2023-03-07

## 2023-02-28 NOTE — PROGRESS NOTES
OB FOLLOW UP  CC- Here for care of pregnancy        Eileen Corado is a 27 y.o.  20w2d patient being seen today for her obstetrical follow up visit. Patient reports no complaints..   She has red, swollen left eye consistent with pink eye.     Her prenatal care is complicated by (and status) :   Patient Active Problem List   Diagnosis   • History of multiple miscarriages   • Irregular menses   • Weight gain, abnormal   • Insulin resistance   • Pre-conception counseling   • Left Paratubal cyst   • PCOS (polycystic ovarian syndrome)   • Infertility, anovulation   • Abnormal uterine bleeding (AUB)   • Screening for cervical cancer   • Left lower quadrant abdominal pain   • Dysmenorrhea   • Depression   • Pregnancy test performed, pregnancy confirmed   • Pregnancy   • Threatened    • Postural dizziness with presyncope   • Pink eye disease of left eye       Flu Status: Declines  Ultrasound Today: No    ROS -   Patient Reports : No Problems  Patient Denies: Loss of Fluid, Vaginal Spotting, Vision Changes, Headaches, Nausea , Vomiting , Contractions and Epigastric pain  Fetal Movement : normal  All other systems reviewed and are negative.       The additional following portions of the patient's history were reviewed and updated as appropriate: allergies, current medications, past family history, past medical history, past social history, past surgical history and problem list.    I have reviewed and agree with the HPI, ROS, and historical information as entered above. Louis Velasco MD    /88   Wt 73 kg (161 lb)   LMP 10/09/2022   BMI 24.48 kg/m²       EXAM:     Prenatal Vitals  BP: 126/88  Weight: 73 kg (161 lb)              Urine Glucose Read-only: Negative  Urine Protein Read-only: Negative       Assessment and Plan    Problem List Items Addressed This Visit        Eye    Pink eye disease of left eye    Relevant Medications    erythromycin (ROMYCIN) 5 MG/GM ophthalmic  ointment       Gravid and     Pregnancy - Primary    Overview     Cell free DNA low risk; c/w male.          Relevant Orders    US Ob 14 + Weeks Single or First Gestation       1. Pregnancy at 20w2d  2. Anatomy scan today is incomplete, follow up in 4 weeks for additional views. Anatomy that was visualized was within normal limits.  3. Fetal status reassuring.   4. Treat pink eye with erythromycin drops  5. Activity and Exercise discussed.  6. Patient is on Prenatal vitamins  Return in about 4 weeks (around 3/28/2023) for Routine prenatal care and US to complete anatomy scan.    Louis Velasco MD  2023

## 2023-03-16 ENCOUNTER — TELEPHONE (OUTPATIENT)
Dept: OBSTETRICS AND GYNECOLOGY | Facility: CLINIC | Age: 28
End: 2023-03-16
Payer: COMMERCIAL

## 2023-03-16 ENCOUNTER — TELEPHONE (OUTPATIENT)
Dept: OBSTETRICS AND GYNECOLOGY | Facility: CLINIC | Age: 28
End: 2023-03-16

## 2023-03-16 ENCOUNTER — ROUTINE PRENATAL (OUTPATIENT)
Dept: OBSTETRICS AND GYNECOLOGY | Facility: CLINIC | Age: 28
End: 2023-03-16
Payer: COMMERCIAL

## 2023-03-16 VITALS — WEIGHT: 163.2 LBS | SYSTOLIC BLOOD PRESSURE: 132 MMHG | BODY MASS INDEX: 24.81 KG/M2 | DIASTOLIC BLOOD PRESSURE: 80 MMHG

## 2023-03-16 DIAGNOSIS — N89.8 VAGINAL DISCHARGE: ICD-10-CM

## 2023-03-16 DIAGNOSIS — Z3A.22 22 WEEKS GESTATION OF PREGNANCY: ICD-10-CM

## 2023-03-16 DIAGNOSIS — S39.91XA BLUNT TRAUMA TO ABDOMEN, INITIAL ENCOUNTER: Primary | ICD-10-CM

## 2023-03-16 PROCEDURE — 0502F SUBSEQUENT PRENATAL CARE: CPT | Performed by: NURSE PRACTITIONER
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
## 2023-03-16 NOTE — TELEPHONE ENCOUNTER
Provider: JOSE HINOJOSA  Caller: ANDRADE MARTINEZ  Relationship to Patient: SELF  Phone Number: [phone]  Reason for Call: PATIENT IS NEEDING A NOTE THAT IS STATING THE SHE CAN RETURN TO WORK TOMORROW//PATIENT STATED THAT THE NOTE THAT SHE GOT THAT STATES SHE WAS SEEN IN THE OFFICE TODAY WILL NOT WORK//SINCE THEY ARE CONSIDERING IT AN INJURY AT WORK IT NEEDS TO STATE THAT SHE CAN RETURN 3/17/23 PLEASE FOLLOW UP IF ANY ISSUES OR CONCERNS OTHERWISE IF THE NOTE CAN BE SENT TO HER MY CHART  When was the patient last seen: 3/16/23  SENDING HIGH PRIORITY BECAUSE SHE NEEDS THE NOTE PRIOR TO TOMORROW MORNING-THANK YOU
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
## 2023-03-20 LAB
A VAGINAE DNA VAG QL NAA+PROBE: NORMAL SCORE
BVAB2 DNA VAG QL NAA+PROBE: NORMAL SCORE
C ALBICANS DNA VAG QL NAA+PROBE: NEGATIVE
C GLABRATA DNA VAG QL NAA+PROBE: NEGATIVE
C KRUSEI DNA VAG QL NAA+PROBE: NEGATIVE
C LUSITANIAE DNA VAG QL NAA+PROBE: NEGATIVE
CANDIDA DNA VAG QL NAA+PROBE: NEGATIVE
MEGA1 DNA VAG QL NAA+PROBE: NORMAL SCORE

## 2023-03-27 ENCOUNTER — ROUTINE PRENATAL (OUTPATIENT)
Dept: OBSTETRICS AND GYNECOLOGY | Facility: CLINIC | Age: 28
End: 2023-03-27
Payer: COMMERCIAL

## 2023-03-27 VITALS — SYSTOLIC BLOOD PRESSURE: 118 MMHG | WEIGHT: 169 LBS | DIASTOLIC BLOOD PRESSURE: 86 MMHG | BODY MASS INDEX: 25.7 KG/M2

## 2023-03-27 DIAGNOSIS — Z3A.24 24 WEEKS GESTATION OF PREGNANCY: Primary | ICD-10-CM

## 2023-03-27 DIAGNOSIS — K21.9 GASTROESOPHAGEAL REFLUX DISEASE WITHOUT ESOPHAGITIS: ICD-10-CM

## 2023-03-27 LAB
GLUCOSE UR STRIP-MCNC: NEGATIVE MG/DL
GLUCOSE UR STRIP-MCNC: NEGATIVE MG/DL
PROT UR STRIP-MCNC: NEGATIVE MG/DL
PROT UR STRIP-MCNC: NEGATIVE MG/DL

## 2023-03-27 RX ORDER — FAMOTIDINE 20 MG/1
20 TABLET, FILM COATED ORAL DAILY
Qty: 30 TABLET | Refills: 3 | Status: SHIPPED | OUTPATIENT
Start: 2023-03-27 | End: 2024-03-26

## 2023-03-27 NOTE — PROGRESS NOTES
OB FOLLOW UP  CC- Here for care of pregnancy        Eileen Corado is a 27 y.o.  24w1d patient being seen today for her obstetrical follow up visit. Patient reports worsening reflux disease.   Tums no longer effective and will start daily pepcid.     Her prenatal care is complicated by (and status) : GERD  Patient Active Problem List   Diagnosis   • History of multiple miscarriages   • Irregular menses   • Weight gain, abnormal   • Insulin resistance   • Pre-conception counseling   • Left Paratubal cyst   • PCOS (polycystic ovarian syndrome)   • Infertility, anovulation   • Abnormal uterine bleeding (AUB)   • Screening for cervical cancer   • Left lower quadrant abdominal pain   • Dysmenorrhea   • Depression   • Pregnancy test performed, pregnancy confirmed   • Pregnancy   • Threatened    • Postural dizziness with presyncope   • Pink eye disease of left eye   • Gastroesophageal reflux disease without esophagitis       Flu Status: Declines  Ultrasound Today: No    ROS -   Patient Reports : No Problems  Patient Denies: Loss of Fluid, Vaginal Spotting, Vision Changes, Headaches, Nausea , Vomiting , Contractions and Epigastric pain  Fetal Movement : normal  All other systems reviewed and are negative.       The additional following portions of the patient's history were reviewed and updated as appropriate: allergies, current medications, past family history, past medical history, past social history, past surgical history and problem list.    I have reviewed and agree with the HPI, ROS, and historical information as entered above. Louis Velasco MD    /86   Wt 76.7 kg (169 lb)   LMP 10/09/2022   BMI 25.70 kg/m²       EXAM:     Prenatal Vitals  BP: 118/86  Weight: 76.7 kg (169 lb)                   Urine Glucose Read-only: Negative  Urine Protein Read-only: Negative       Assessment and Plan    Problem List Items Addressed This Visit        Gastrointestinal Abdominal      Gastroesophageal reflux disease without esophagitis    Relevant Medications    famotidine (Pepcid) 20 MG tablet       Gravid and     Pregnancy - Primary    Overview     Cell free DNA low risk; c/w male.             1. Pregnancy at 24w1d  2. Fetal status reassuring.  3. No US indicated today.  4. 1 hour gtt, CBC, Antibody screen and TDAP next visit. Instructions given  5. Fetal movement/PTL or Labor precautions  6. Reviewed routine prenatal care with the office and educational materials given  7. Medication(s) Ordered  8. Discussed/encouraged TDAP vaccination after 28 weeks  9. Activity and Exercise discussed.  Return in about 4 weeks (around 2023) for Routine prenatal care and 1 hr GTT.    Louis Velasco MD  2023

## 2023-03-30 ENCOUNTER — ROUTINE PRENATAL (OUTPATIENT)
Dept: OBSTETRICS AND GYNECOLOGY | Facility: CLINIC | Age: 28
End: 2023-03-30
Payer: COMMERCIAL

## 2023-03-30 ENCOUNTER — TELEPHONE (OUTPATIENT)
Dept: OBSTETRICS AND GYNECOLOGY | Facility: CLINIC | Age: 28
End: 2023-03-30
Payer: COMMERCIAL

## 2023-03-30 VITALS — SYSTOLIC BLOOD PRESSURE: 115 MMHG | DIASTOLIC BLOOD PRESSURE: 78 MMHG | WEIGHT: 170.6 LBS | BODY MASS INDEX: 25.94 KG/M2

## 2023-03-30 DIAGNOSIS — O46.90 VAGINAL BLEEDING IN PREGNANCY: Primary | ICD-10-CM

## 2023-03-30 LAB
BILIRUB BLD-MCNC: NEGATIVE MG/DL
CLARITY, POC: ABNORMAL
COLOR UR: ABNORMAL
GLUCOSE UR STRIP-MCNC: NEGATIVE MG/DL
KETONES UR QL: NEGATIVE
LEUKOCYTE EST, POC: NEGATIVE
NITRITE UR-MCNC: NEGATIVE MG/ML
PH UR: 7 [PH] (ref 5–8)
PROT UR STRIP-MCNC: ABNORMAL MG/DL
RBC # UR STRIP: ABNORMAL /UL
SP GR UR: 1.01 (ref 1–1.03)
UROBILINOGEN UR QL: ABNORMAL

## 2023-03-30 NOTE — TELEPHONE ENCOUNTER
Patient called stating she is having some vaginal bleeding with back pain. Patient is scheduled to come in this morning for U/S and UA.

## 2023-03-30 NOTE — PROGRESS NOTES
OB FOLLOW UP  CC- Here for care of pregnancy        Eileen Corado is a 27 y.o.  24w4d patient being seen today for bleeding. It started around 930 pm last night and has stopped since then. She feels like it was mostly in her urine. She denies pain or burning with urination. She reports some bright red blood when wiping.The bleeding was bright red in color. It is currently stopped. The patient's blood type is RH Positive. She denies recent intercourse. She has not had a recent cervical check. She does have associated pain. She reports lower back pain. She also reports an increase in her HR. Denies nausea or vomiting.      Her prenatal care is complicated by (and status) :    Patient Active Problem List   Diagnosis   • History of multiple miscarriages   • Irregular menses   • Weight gain, abnormal   • Insulin resistance   • Pre-conception counseling   • Left Paratubal cyst   • PCOS (polycystic ovarian syndrome)   • Infertility, anovulation   • Abnormal uterine bleeding (AUB)   • Screening for cervical cancer   • Left lower quadrant abdominal pain   • Dysmenorrhea   • Depression   • Pregnancy test performed, pregnancy confirmed   • Pregnancy   • Threatened    • Postural dizziness with presyncope   • Pink eye disease of left eye   • Gastroesophageal reflux disease without esophagitis         Ultrasound Today: Yes.  Findings showed  bpm. Placental site posterior.   I have personally evaluated the U/S and agree with the findings. JOSE Corral    ROS -   Patient Reports : vaginal bleeding, bloody urine, low back pain  Patient Denies: Loss of Fluid, Vision Changes, Headaches, Nausea  and Vomiting   Fetal Movement : normal  All other systems reviewed and are negative.       The additional following portions of the patient's history were reviewed and updated as appropriate: allergies and current medications.    I have reviewed and agree with the HPI, ROS, and historical  information as entered above. Ayanna Lugo, APRN    /78   Wt 77.4 kg (170 lb 9.6 oz)   LMP 10/09/2022   BMI 25.94 kg/m²       EXAM:   Pt. Has minimal amount of dark brown discharge on exam     Prenatal Vitals  BP: 115/78  Weight: 77.4 kg (170 lb 9.6 oz)   Fetal Heart Rate: 143                Assessment and Plan    Problem List Items Addressed This Visit    None  Visit Diagnoses     Vaginal bleeding in pregnancy    -  Primary    Relevant Orders    POC Urinalysis Dipstick (Completed)    Urine Culture - Urine, Urine, Clean Catch          1. Pregnancy at 24w4d  2. Fetal status reassuring.   3. Ultrasound findings reviewed with pt. FHR= 143bpm, placental site posterior. CL= 36.2mm  MBT= A positive  Pt. Advised pelvic rest, no intercourse for the next 4 weeks, avoid heavy lifting  CCUA 3+ blood, no leuks. Urine sent for culture  Will call for any increased pain or heavy bleeding  Keep next [facility] appt. As scheduled.     JOSE Corral  03/30/2023

## 2023-04-01 LAB
BACTERIA UR CULT: NO GROWTH
BACTERIA UR CULT: NORMAL

## 2023-04-24 ENCOUNTER — ROUTINE PRENATAL (OUTPATIENT)
Dept: OBSTETRICS AND GYNECOLOGY | Facility: CLINIC | Age: 28
End: 2023-04-24
Payer: COMMERCIAL

## 2023-04-24 VITALS — WEIGHT: 174 LBS | DIASTOLIC BLOOD PRESSURE: 78 MMHG | BODY MASS INDEX: 26.46 KG/M2 | SYSTOLIC BLOOD PRESSURE: 122 MMHG

## 2023-04-24 DIAGNOSIS — Z13.1 SCREENING FOR DIABETES MELLITUS: Primary | ICD-10-CM

## 2023-04-24 DIAGNOSIS — Z3A.28 28 WEEKS GESTATION OF PREGNANCY: ICD-10-CM

## 2023-04-24 LAB
GLUCOSE UR STRIP-MCNC: NEGATIVE MG/DL
PROT UR STRIP-MCNC: ABNORMAL MG/DL

## 2023-04-24 PROCEDURE — 0502F SUBSEQUENT PRENATAL CARE: CPT | Performed by: OBSTETRICS & GYNECOLOGY

## 2023-04-24 NOTE — PROGRESS NOTES
OB FOLLOW UP  CC- Here for care of pregnancy        Eileen Corado is a 27 y.o.  28w1d patient being seen today for her obstetrical follow up. Patient reports no complaints..     Patient undergoing Glucola testing today. She is due for her testing at 10:13a.m.       MBT: A+  Rhogam: na  28 week packet: reviewed with patient  and counseled on fetal movement   TDAP: given today  Flu Status: Declines  Ultrasound Today: No    Her prenatal care is complicated by (and status) :  None  Patient Active Problem List   Diagnosis   • History of multiple miscarriages   • Irregular menses   • Weight gain, abnormal   • Insulin resistance   • Pre-conception counseling   • Left Paratubal cyst   • PCOS (polycystic ovarian syndrome)   • Infertility, anovulation   • Abnormal uterine bleeding (AUB)   • Screening for cervical cancer   • Left lower quadrant abdominal pain   • Dysmenorrhea   • Depression   • Pregnancy test performed, pregnancy confirmed   • Pregnancy   • Threatened    • Postural dizziness with presyncope   • Pink eye disease of left eye   • Gastroesophageal reflux disease without esophagitis         ROS -   Patient Reports : No Problems  Patient Denies: Loss of Fluid, Vaginal Spotting, Vision Changes, Headaches, Nausea , Vomiting , Contractions and Epigastric pain  Fetal Movement : normal    The additional following portions of the patient's history were reviewed and updated as appropriate: allergies and current medications.    I have reviewed and agree with the HPI, ROS, and historical information as entered above. Louis Velasco MD    /78   Wt 78.9 kg (174 lb)   LMP 10/09/2022   BMI 26.46 kg/m²         EXAM:     Prenatal Vitals  BP: 122/78  Weight: 78.9 kg (174 lb)                   Urine Glucose Read-only: Negative  Urine Protein Read-only: (!) Trace       Assessment and Plan    Problem List Items Addressed This Visit        Gravid and     Pregnancy    Overview      Cell free DNA low risk; c/w male.         Other Visit Diagnoses     Screening for diabetes mellitus    -  Primary    Relevant Orders    Antibody Screen    CBC (No Diff)    Gestational Screen 1 Hr ([facility])          1. Pregnancy at 28w1d  2. 1 hr Glucola, CBC, and antibody screen today  and TDAP given today  3. Fetal movement/PTL or Labor precautions  4. Lab(s) Ordered  5. Patient is on Prenatal vitamins  6. Reviewed Pre-eclampsia signs/symptoms  7. Activity and Exercise discussed.  Return in about 2 weeks (around 5/8/2023) for Routine prenatal care.    Louis Velasco MD  04/24/2023

## 2023-04-25 LAB
BLD GP AB SCN SERPL QL: NEGATIVE
ERYTHROCYTE [DISTWIDTH] IN BLOOD BY AUTOMATED COUNT: 12.5 % (ref 12.3–15.4)
GLUCOSE 1H P 50 G GLC PO SERPL-MCNC: 61 MG/DL (ref 65–139)
HCT VFR BLD AUTO: 35 % (ref 34–46.6)
HGB BLD-MCNC: 11.9 G/DL (ref 12–15.9)
MCH RBC QN AUTO: 31.5 PG (ref 26.6–33)
MCHC RBC AUTO-ENTMCNC: 34 G/DL (ref 31.5–35.7)
MCV RBC AUTO: 92.6 FL (ref 79–97)
PLATELET # BLD AUTO: 163 10*3/MM3 (ref 140–450)
RBC # BLD AUTO: 3.78 10*6/MM3 (ref 3.77–5.28)
WBC # BLD AUTO: 11.06 10*3/MM3 (ref 3.4–10.8)